# Patient Record
Sex: MALE | Race: WHITE | Employment: FULL TIME | URBAN - METROPOLITAN AREA
[De-identification: names, ages, dates, MRNs, and addresses within clinical notes are randomized per-mention and may not be internally consistent; named-entity substitution may affect disease eponyms.]

---

## 2018-12-28 ENCOUNTER — HOSPITAL ENCOUNTER (EMERGENCY)
Age: 58
Discharge: HOME | End: 2018-12-28
Attending: EMERGENCY MEDICINE
Payer: SELF-PAY

## 2018-12-28 ENCOUNTER — APPOINTMENT (OUTPATIENT)
Dept: CT IMAGING | Age: 58
End: 2018-12-28
Attending: PHYSICIAN ASSISTANT
Payer: SELF-PAY

## 2018-12-28 VITALS
BODY MASS INDEX: 30.61 KG/M2 | RESPIRATION RATE: 18 BRPM | WEIGHT: 195 LBS | HEIGHT: 67 IN | DIASTOLIC BLOOD PRESSURE: 106 MMHG | HEART RATE: 111 BPM | SYSTOLIC BLOOD PRESSURE: 157 MMHG | OXYGEN SATURATION: 97 % | TEMPERATURE: 98.3 F

## 2018-12-28 DIAGNOSIS — R16.0 HEPATOMEGALY: ICD-10-CM

## 2018-12-28 DIAGNOSIS — K42.9 UMBILICAL HERNIA WITHOUT OBSTRUCTION AND WITHOUT GANGRENE: Primary | ICD-10-CM

## 2018-12-28 DIAGNOSIS — R03.0 ELEVATED BLOOD PRESSURE READING: ICD-10-CM

## 2018-12-28 DIAGNOSIS — R10.9 INTERMITTENT ABDOMINAL PAIN: ICD-10-CM

## 2018-12-28 DIAGNOSIS — R74.8 ELEVATED LIVER ENZYMES: ICD-10-CM

## 2018-12-28 LAB
ALBUMIN SERPL-MCNC: 4.1 G/DL (ref 3.4–5)
ALBUMIN/GLOB SERPL: 0.9 {RATIO} (ref 0.8–1.7)
ALP SERPL-CCNC: 136 U/L (ref 45–117)
ALT SERPL-CCNC: 72 U/L (ref 16–61)
ANION GAP SERPL CALC-SCNC: 11 MMOL/L (ref 3–18)
APPEARANCE UR: CLEAR
AST SERPL-CCNC: 67 U/L (ref 15–37)
BASOPHILS # BLD: 0 K/UL (ref 0–0.1)
BASOPHILS NFR BLD: 0 % (ref 0–2)
BILIRUB SERPL-MCNC: 0.3 MG/DL (ref 0.2–1)
BILIRUB UR QL: NEGATIVE
BUN SERPL-MCNC: 12 MG/DL (ref 7–18)
BUN/CREAT SERPL: 10 (ref 12–20)
CALCIUM SERPL-MCNC: 9.9 MG/DL (ref 8.5–10.1)
CHLORIDE SERPL-SCNC: 101 MMOL/L (ref 100–108)
CO2 SERPL-SCNC: 28 MMOL/L (ref 21–32)
COLOR UR: YELLOW
CREAT SERPL-MCNC: 1.16 MG/DL (ref 0.6–1.3)
DIFFERENTIAL METHOD BLD: ABNORMAL
EOSINOPHIL # BLD: 0.5 K/UL (ref 0–0.4)
EOSINOPHIL NFR BLD: 4 % (ref 0–5)
ERYTHROCYTE [DISTWIDTH] IN BLOOD BY AUTOMATED COUNT: 13.4 % (ref 11.6–14.5)
GLOBULIN SER CALC-MCNC: 4.8 G/DL (ref 2–4)
GLUCOSE SERPL-MCNC: 120 MG/DL (ref 74–99)
GLUCOSE UR STRIP.AUTO-MCNC: NEGATIVE MG/DL
HCT VFR BLD AUTO: 49.8 % (ref 36–48)
HGB BLD-MCNC: 16.6 G/DL (ref 13–16)
HGB UR QL STRIP: NEGATIVE
KETONES UR QL STRIP.AUTO: NEGATIVE MG/DL
LEUKOCYTE ESTERASE UR QL STRIP.AUTO: NEGATIVE
LIPASE SERPL-CCNC: 219 U/L (ref 73–393)
LYMPHOCYTES # BLD: 3.1 K/UL (ref 0.9–3.6)
LYMPHOCYTES NFR BLD: 24 % (ref 21–52)
MCH RBC QN AUTO: 30.2 PG (ref 24–34)
MCHC RBC AUTO-ENTMCNC: 33.3 G/DL (ref 31–37)
MCV RBC AUTO: 90.5 FL (ref 74–97)
MONOCYTES # BLD: 1.3 K/UL (ref 0.05–1.2)
MONOCYTES NFR BLD: 10 % (ref 3–10)
NEUTS SEG # BLD: 7.8 K/UL (ref 1.8–8)
NEUTS SEG NFR BLD: 62 % (ref 40–73)
NITRITE UR QL STRIP.AUTO: NEGATIVE
PH UR STRIP: 5 [PH] (ref 5–8)
PLATELET # BLD AUTO: 341 K/UL (ref 135–420)
PMV BLD AUTO: 9.8 FL (ref 9.2–11.8)
POTASSIUM SERPL-SCNC: 4.2 MMOL/L (ref 3.5–5.5)
PROT SERPL-MCNC: 8.9 G/DL (ref 6.4–8.2)
PROT UR STRIP-MCNC: NEGATIVE MG/DL
RBC # BLD AUTO: 5.5 M/UL (ref 4.7–5.5)
SODIUM SERPL-SCNC: 140 MMOL/L (ref 136–145)
SP GR UR REFRACTOMETRY: >1.03 (ref 1–1.03)
UROBILINOGEN UR QL STRIP.AUTO: 0.2 EU/DL (ref 0.2–1)
WBC # BLD AUTO: 12.7 K/UL (ref 4.6–13.2)

## 2018-12-28 PROCEDURE — 80053 COMPREHEN METABOLIC PANEL: CPT

## 2018-12-28 PROCEDURE — 74011636320 HC RX REV CODE- 636/320: Performed by: EMERGENCY MEDICINE

## 2018-12-28 PROCEDURE — 81003 URINALYSIS AUTO W/O SCOPE: CPT

## 2018-12-28 PROCEDURE — 99283 EMERGENCY DEPT VISIT LOW MDM: CPT

## 2018-12-28 PROCEDURE — 74177 CT ABD & PELVIS W/CONTRAST: CPT

## 2018-12-28 PROCEDURE — 85025 COMPLETE CBC W/AUTO DIFF WBC: CPT

## 2018-12-28 PROCEDURE — 83690 ASSAY OF LIPASE: CPT

## 2018-12-28 RX ADMIN — IOPAMIDOL 100 ML: 612 INJECTION, SOLUTION INTRAVENOUS at 17:44

## 2018-12-28 NOTE — ED NOTES
I performed a brief history of the patient here in triage and I have determined that pt will need further treatment and evaluation from the main side ER physician. I have placed initial orders based on the history to help in expediting patients care.     
 
LUCAS Villasenor 3:54 PM

## 2018-12-28 NOTE — ED TRIAGE NOTES
Chronic hx of abd hernia periumbical for many years, recently starting to \"cramp up\" when twisting etc, no recent injury

## 2018-12-29 NOTE — DISCHARGE INSTRUCTIONS
Hernia: Care Instructions  Your Care Instructions    A hernia develops when tissue bulges through a weak spot in the wall of your belly. The groin area and the navel are common areas for a hernia. A hernia can also develop near the area of a surgery you had before. Pressure from lifting, straining, or coughing can tear the weak area, causing the hernia to bulge and be painful. If you cannot push a hernia back into place, the tissue may become trapped outside the belly wall. If the hernia gets twisted and loses its blood supply, it will swell and die. This is called a strangulated hernia. It usually causes a lot of pain. It needs treatment right away. Some hernias need to be repaired to prevent a strangulated hernia. If your hernia causes symptoms or is large, you may need surgery. Follow-up care is a key part of your treatment and safety. Be sure to make and go to all appointments, and call your doctor if you are having problems. It's also a good idea to know your test results and keep a list of the medicines you take. How can you care for yourself at home? · Take care when lifting heavy objects. · Stay at a healthy weight. · Do not smoke. Smoking can cause coughing, which can cause your hernia to bulge. If you need help quitting, talk to your doctor about stop-smoking programs and medicines. These can increase your chances of quitting for good. · Talk with your doctor before wearing a corset or truss for a hernia. These devices are not recommended for treating hernias and sometimes can do more harm than good. There may be certain situations when your doctor thinks a truss would work, but these are rare. When should you call for help?   Call your doctor now or seek immediate medical care if:    · You have new or worse belly pain.     · You are vomiting.     · You cannot pass stools or gas.     · You cannot push the hernia back into place with gentle pressure when you are lying down.     · The area over the hernia turns red or becomes tender.    Watch closely for changes in your health, and be sure to contact your doctor if you have any problems. Where can you learn more? Go to http://rc-johanny.info/. Enter C129 in the search box to learn more about \"Hernia: Care Instructions. \"  Current as of: March 28, 2018  Content Version: 11.8  © 3591-3116 [employer]. Care instructions adapted under license by [employer] (which disclaims liability or warranty for this information). If you have questions about a medical condition or this instruction, always ask your healthcare professional. [place] [address] any warranty or liability for your use of this information. Elevated Blood Pressure: Care Instructions  Your Care Instructions    Blood pressure is a measure of how hard the blood pushes against the walls of your arteries. It's normal for blood pressure to go up and down throughout the day. But if it stays up over time, you have high blood pressure. Two numbers tell you your blood pressure. The first number is the systolic pressure. It shows how hard the blood pushes when your heart is pumping. The second number is the diastolic pressure. It shows how hard the blood pushes between heartbeats, when your heart is relaxed and filling with blood. An ideal blood pressure in adults is less than 120/80 (say \"120 over 80\"). High blood pressure is 140/90 or higher. You have high blood pressure if your top number is 140 or higher or your bottom number is 90 or higher, or both. The main test for high blood pressure is simple, fast, and painless. To diagnose high blood pressure, your doctor will test your blood pressure at different times. After testing your blood pressure, your doctor may ask you to test it again when you are home. If you are diagnosed with high blood pressure, you can work with your doctor to make a long-term plan to manage it.   Follow-up care is a key part of your treatment and safety. Be sure to make and go to all appointments, and call your doctor if you are having problems. It's also a good idea to know your test results and keep a list of the medicines you take. How can you care for yourself at home? · Do not smoke. Smoking increases your risk for heart attack and stroke. If you need help quitting, talk to your doctor about stop-smoking programs and medicines. These can increase your chances of quitting for good. · Stay at a healthy weight. · Try to limit how much sodium you eat to less than 2,300 milligrams (mg) a day. Your doctor may ask you to try to eat less than 1,500 mg a day. · Be physically active. Get at least 30 minutes of exercise on most days of the week. Walking is a good choice. You also may want to do other activities, such as running, swimming, cycling, or playing tennis or team sports. · Avoid or limit alcohol. Talk to your doctor about whether you can drink any alcohol. · Eat plenty of fruits, vegetables, and low-fat dairy products. Eat less saturated and total fats. · Learn how to check your blood pressure at home. When should you call for help? Call your doctor now or seek immediate medical care if:  ? · Your blood pressure is much higher than normal (such as 180/110 or higher). ? · You think high blood pressure is causing symptoms such as:  ¨ Severe headache. ¨ Blurry vision. ? Watch closely for changes in your health, and be sure to contact your doctor if:  ? · You do not get better as expected. Where can you learn more? Go to http://rc-johanny.info/. Enter L530 in the search box to learn more about \"Elevated Blood Pressure: Care Instructions. \"  Current as of: September 21, 2016  Content Version: 11.4  © 1426-2322 [employer]. Care instructions adapted under license by [employer] (which disclaims liability or warranty for this information).  If you have questions about a medical condition or this instruction, always ask your healthcare professional. [place] [address] any warranty or liability for your use of this information.

## 2018-12-29 NOTE — ED PROVIDER NOTES
EMERGENCY DEPARTMENT HISTORY AND PHYSICAL EXAM 
 
Date: 12/28/2018 Patient Name: Charito Artis History of Presenting Illness Chief Complaint Patient presents with  Abdominal Pain History Provided By: Patient Chief Complaint: abd pain Duration: months Timing:  Intermittent Location: bilateral upper abd Additional History (Context): Charito Artis is a 66 y.o. male with obesity and daily ETOH intake of about 6 beers who presents with C/O intermittent bilateral upper abdominal pain that comes and goes. States that he has been feeling this pain for months. States it feels crampy. States he will stretch and it will feel better. Denies associated NVD, fevers, chills, headache, chest pain, SOB. Admits to daily ETOH use, about 6 beers a day. States that he is worried that this belly pain is related to his hernia that he has had for 15 years. States the hernia itself does not hurt. States that he does lift heavy items for his job. Denies any other complaints. PCP: None Past History Past Medical History: 
History reviewed. No pertinent past medical history. Past Surgical History: 
History reviewed. No pertinent surgical history. Family History: 
History reviewed. No pertinent family history. Social History: 
Social History Tobacco Use  Smoking status: Current Every Day Smoker  Smokeless tobacco: Never Used Substance Use Topics  Alcohol use: Yes Alcohol/week: 14.4 oz Types: 24 Cans of beer per week Comment: case a week of beer  Drug use: No  
 
 
Allergies: 
No Known Allergies Review of Systems Review of Systems Constitutional: Negative for chills and fever. Respiratory: Negative for chest tightness, shortness of breath and wheezing. Cardiovascular: Negative for chest pain and palpitations. Gastrointestinal: Positive for abdominal pain. Negative for diarrhea, nausea and vomiting. Bilateral upper side pain hernia Genitourinary: Negative for dysuria, flank pain and frequency. Musculoskeletal: Negative. Skin: Negative. Neurological: Negative for tremors, syncope, speech difficulty and headaches. All other systems reviewed and are negative. Physical Exam  
 
Vitals:  
 12/28/18 1553 12/28/18 1718 12/28/18 1721 12/28/18 1901 BP: (!) 193/114 (!) 157/106 (!) 157/106 Pulse: (!) 126  (!) 113 (!) 111 Resp: 18 18 18 Temp: 98.3 °F (36.8 °C) SpO2: 97%  98% 97% Weight: 88.5 kg (195 lb) Height: 5' 7\" (1.702 m) Physical Exam  
Constitutional: He is oriented to person, place, and time. He appears well-developed and well-nourished. No distress. Appears older than stated age. Has skin changes to suggest chronic drinker with erythema of the cheeks. HENT:  
Head: Normocephalic and atraumatic. Eyes: EOM are normal. Pupils are equal, round, and reactive to light. Neck: Neck supple. Cardiovascular: Normal rate and regular rhythm. Exam reveals no gallop and no friction rub. No murmur heard. Pulmonary/Chest: Effort normal and breath sounds normal. No respiratory distress. He has no wheezes. He has no rales. Abdominal: Soft. Bowel sounds are normal. He exhibits distension. He exhibits no pulsatile liver, no fluid wave, no ascites and no mass. There is hepatomegaly. There is no splenomegaly. There is no tenderness. There is no rebound, no guarding and no CVA tenderness. A hernia is present. There is a umbilical hernia that is easily reducible. There is distension of the abdomen and noted hepatomegaly. No fluid wave or ascites appreciated Non tender to palpation throughout the entire abdomen Musculoskeletal: Normal range of motion. He exhibits no tenderness or deformity. Lymphadenopathy:  
  He has no cervical adenopathy. Neurological: He is alert and oriented to person, place, and time. No tremors Skin: Skin is warm and dry. No rash noted. He is not diaphoretic. Psychiatric: He has a normal mood and affect. His behavior is normal.  
Nursing note and vitals reviewed. Diagnostic Study Results Labs - Recent Results (from the past 12 hour(s)) CBC WITH AUTOMATED DIFF Collection Time: 12/28/18  4:35 PM  
Result Value Ref Range WBC 12.7 4.6 - 13.2 K/uL  
 RBC 5.50 4.70 - 5.50 M/uL  
 HGB 16.6 (H) 13.0 - 16.0 g/dL HCT 49.8 (H) 36.0 - 48.0 % MCV 90.5 74.0 - 97.0 FL  
 MCH 30.2 24.0 - 34.0 PG  
 MCHC 33.3 31.0 - 37.0 g/dL  
 RDW 13.4 11.6 - 14.5 % PLATELET 385 871 - 894 K/uL MPV 9.8 9.2 - 11.8 FL  
 NEUTROPHILS 62 40 - 73 % LYMPHOCYTES 24 21 - 52 % MONOCYTES 10 3 - 10 % EOSINOPHILS 4 0 - 5 % BASOPHILS 0 0 - 2 %  
 ABS. NEUTROPHILS 7.8 1.8 - 8.0 K/UL  
 ABS. LYMPHOCYTES 3.1 0.9 - 3.6 K/UL  
 ABS. MONOCYTES 1.3 (H) 0.05 - 1.2 K/UL  
 ABS. EOSINOPHILS 0.5 (H) 0.0 - 0.4 K/UL  
 ABS. BASOPHILS 0.0 0.0 - 0.1 K/UL  
 DF AUTOMATED METABOLIC PANEL, COMPREHENSIVE Collection Time: 12/28/18  4:35 PM  
Result Value Ref Range Sodium 140 136 - 145 mmol/L Potassium 4.2 3.5 - 5.5 mmol/L Chloride 101 100 - 108 mmol/L  
 CO2 28 21 - 32 mmol/L Anion gap 11 3.0 - 18 mmol/L Glucose 120 (H) 74 - 99 mg/dL BUN 12 7.0 - 18 MG/DL Creatinine 1.16 0.6 - 1.3 MG/DL  
 BUN/Creatinine ratio 10 (L) 12 - 20 GFR est AA >60 >60 ml/min/1.73m2 GFR est non-AA >60 >60 ml/min/1.73m2 Calcium 9.9 8.5 - 10.1 MG/DL Bilirubin, total 0.3 0.2 - 1.0 MG/DL  
 ALT (SGPT) 72 (H) 16 - 61 U/L  
 AST (SGOT) 67 (H) 15 - 37 U/L Alk. phosphatase 136 (H) 45 - 117 U/L Protein, total 8.9 (H) 6.4 - 8.2 g/dL Albumin 4.1 3.4 - 5.0 g/dL Globulin 4.8 (H) 2.0 - 4.0 g/dL A-G Ratio 0.9 0.8 - 1.7 LIPASE Collection Time: 12/28/18  4:35 PM  
Result Value Ref Range Lipase 219 73 - 393 U/L  
URINALYSIS W/ RFLX MICROSCOPIC Collection Time: 12/28/18  5:55 PM  
Result Value Ref Range Color YELLOW  Appearance CLEAR    
 Specific gravity >1.030 (H) 1.005 - 1.030  
 pH (UA) 5.0 5.0 - 8.0 Protein NEGATIVE  NEG mg/dL Glucose NEGATIVE  NEG mg/dL Ketone NEGATIVE  NEG mg/dL Bilirubin NEGATIVE  NEG Blood NEGATIVE  NEG Urobilinogen 0.2 0.2 - 1.0 EU/dL Nitrites NEGATIVE  NEG Leukocyte Esterase NEGATIVE  NEG Radiologic Studies -  
CT ABD PELV W CONT Final Result IMPRESSION:   
  
  
1. Hepatic steatosis and hepatomegaly. 2. Supraumbilical fat-containing ventral hernia with mild stranding. 3. Additional tiny fat-containing umbilical hernia and small to moderate  
fat-containing right inguinal hernia. CT Results  (Last 48 hours) 12/28/18 1742  CT ABD PELV W CONT Final result Impression:  IMPRESSION:   
   
   
1. Hepatic steatosis and hepatomegaly. 2. Supraumbilical fat-containing ventral hernia with mild stranding. 3. Additional tiny fat-containing umbilical hernia and small to moderate  
fat-containing right inguinal hernia. Narrative:  CT ABDOMEN AND PELVIS, WITH CONTRAST INDICATION: Abdominal pain; abdominal distention COMPARISON: None TECHNIQUE: Following intravenous administration of 100 mL Isovue-300, serial  
axial CT images through the abdomen and pelvis were obtained using helical  
technique. Additional coronal and sagittal reformation images were also  
performed. All CT scans at this facility are performed using dose optimization  
technique as appropriate to a performed exam, to include automated exposure  
control, adjustment of the mA and/or kV according to patient's size (including  
appropriate matching for site-specific examinations), or use of iterative  
reconstruction technique. FINDINGS:  
-Lower chest: Visualized portions of lung bases are clear.   
-Liver: Inhomogeneous hepatic steatosis. Liver measures 20.1 cm in craniocaudal  
length. -Biliary: Unremarkable   
-Spleen: Unremarkable -Pancreas: Unremarkable   
-Adrenal glands: Unremarkable   
-Kidneys: Unremarkable -GI tract: The bowel is nonobstructed. The appendix is normal.   
   
-Peritoneum/retroperitoneum: No free intraperitoneal air or fluid.   
-Vasculature: The abdominal aorta is nonaneurysmal. Mild atherosclerosis noted.   
-Lymph nodes:No adenopathy identified.   
   
-Pelvis: The urinary bladder is unremarkable. No pelvic adenopathy or free  
pelvic fluid. Prostate gland measures 3.4 x 3.0 x 3.6 cm. Small to  
moderate-sized fat-containing right inguinal hernia. -Body wall: Supraumbilical fat-containing ventral hernia, with mild stranding. Tiny fat-containing umbilical hernia. -Bones: No acute osseous abnormality. Moderate to severe disc height loss at L5/S1. CXR Results  (Last 48 hours) None Medical Decision Making I am the first provider for this patient. I reviewed the vital signs, available nursing notes, past medical history, past surgical history, family history and social history. Vital Signs-Reviewed the patient's vital signs. Records Reviewed: Nursing Notes and Old Medical Records Procedures: 
Procedures Provider Notes (Medical Decision Making): Impression:   Hepatomegaly, umbilical hernia without strangulation or obstruction. Noted elevated BP as well as elevated LFTs. Will send to GI for Liver, General surgery for hernia, PCP for elevated BP. Encouraged to return if worse at all, particularly if jaundice, fever, worsening abd pain. Patient agrees with the plan. LUCAS Morris 
 
MED RECONCILIATION: 
No current facility-administered medications for this encounter. No current outpatient medications on file. Disposition: 
discharged DISCHARGE NOTE:  
 
Pt has been reexamined. Patient has no new complaints, changes, or physical findings. Care plan outlined and precautions discussed.   Results were reviewed with the patient. All medications were reviewed with the patient; will d/c home. All of pt's questions and concerns were addressed. Patient was instructed and agrees to follow up with PCP, GI, general surgery, as well as to return to the ED upon further deterioration. Patient is ready to go home. Follow-up Information Follow up With Specialties Details Why Contact Info [id number] [facility] EMERGENCY DEPT Emergency Medicine  If symptoms worsen [address] [place] [id number] 
[phone] [facility] [address] [facility] [address] [phone] Hoda Garner [address]  Call to help get your appropriate follow up for primary care, GI, and surgery [facility] [address] [facility] [address] [phone] Lyla Navarro MD Gastroenterology Call in 3 days for liver follow up [address] [place] [address] [address] [facility] 
[phone] Cedrick Vargas MD Surgery Call in 3 days for general surgery [address] [place] [id number] [address] 
[address] [place] [address] 
[phone] There are no discharge medications for this patient. Diagnosis Clinical Impression: 1. Umbilical hernia without obstruction and without gangrene 2. Intermittent abdominal pain 3. Hepatomegaly 4. Elevated liver enzymes 5. Elevated blood pressure reading

## 2018-12-29 NOTE — ED NOTES
I have reviewed discharge instructions with the patient and spouse. The patient and spouse verbalized understanding. Patient armband removed and shredded There are no discharge medications for this patient.

## 2019-02-20 ENCOUNTER — HOSPITAL ENCOUNTER (OUTPATIENT)
Dept: LAB | Age: 59
Discharge: HOME | End: 2019-02-20

## 2019-02-20 ENCOUNTER — OFFICE VISIT (OUTPATIENT)
Dept: FAMILY MEDICINE CLINIC | Age: 59
End: 2019-02-20

## 2019-02-20 VITALS
TEMPERATURE: 98.2 F | HEART RATE: 121 BPM | HEIGHT: 67 IN | DIASTOLIC BLOOD PRESSURE: 108 MMHG | RESPIRATION RATE: 24 BRPM | BODY MASS INDEX: 31.64 KG/M2 | OXYGEN SATURATION: 98 % | SYSTOLIC BLOOD PRESSURE: 161 MMHG | WEIGHT: 201.6 LBS

## 2019-02-20 DIAGNOSIS — R79.89 ELEVATED TSH: ICD-10-CM

## 2019-02-20 DIAGNOSIS — F17.200 CURRENT SMOKER: ICD-10-CM

## 2019-02-20 DIAGNOSIS — K46.9 ABDOMINAL HERNIA WITHOUT OBSTRUCTION AND WITHOUT GANGRENE, RECURRENCE NOT SPECIFIED, UNSPECIFIED HERNIA TYPE: ICD-10-CM

## 2019-02-20 DIAGNOSIS — F10.10 ALCOHOL ABUSE: ICD-10-CM

## 2019-02-20 DIAGNOSIS — E78.5 DYSLIPIDEMIA, GOAL LDL BELOW 100: ICD-10-CM

## 2019-02-20 DIAGNOSIS — Z12.11 COLON CANCER SCREENING: ICD-10-CM

## 2019-02-20 DIAGNOSIS — I10 ESSENTIAL HYPERTENSION: ICD-10-CM

## 2019-02-20 DIAGNOSIS — R74.8 ELEVATED LIVER ENZYMES: ICD-10-CM

## 2019-02-20 DIAGNOSIS — Z79.899 MEDICATION MANAGEMENT: ICD-10-CM

## 2019-02-20 DIAGNOSIS — E78.1 HYPERTRIGLYCERIDEMIA: ICD-10-CM

## 2019-02-20 DIAGNOSIS — R00.2 HEART PALPITATIONS: ICD-10-CM

## 2019-02-20 DIAGNOSIS — Z76.89 ENCOUNTER TO ESTABLISH CARE: ICD-10-CM

## 2019-02-20 DIAGNOSIS — R00.0 TACHYCARDIA: ICD-10-CM

## 2019-02-20 DIAGNOSIS — Z76.89 ENCOUNTER TO ESTABLISH CARE: Primary | ICD-10-CM

## 2019-02-20 LAB
AMPHET UR QL SCN: NEGATIVE
BARBITURATES UR QL SCN: NEGATIVE
BENZODIAZ UR QL: NEGATIVE
CANNABINOIDS UR QL SCN: NEGATIVE
CHOLEST SERPL-MCNC: 219 MG/DL
COCAINE UR QL SCN: NEGATIVE
EST. AVERAGE GLUCOSE BLD GHB EST-MCNC: 123 MG/DL
HBA1C MFR BLD: 5.9 % (ref 4.2–5.6)
HDLC SERPL-MCNC: 32 MG/DL (ref 40–60)
HDLC SERPL: 6.8 {RATIO} (ref 0–5)
HDSCOM,HDSCOM: NORMAL
LDLC SERPL CALC-MCNC: 149.8 MG/DL (ref 0–100)
LIPID PROFILE,FLP: ABNORMAL
MAGNESIUM SERPL-MCNC: 2.2 MG/DL (ref 1.6–2.6)
METHADONE UR QL: NEGATIVE
OPIATES UR QL: NEGATIVE
PCP UR QL: NEGATIVE
PSA SERPL-MCNC: 0.4 NG/ML (ref 0–4)
T3FREE SERPL-MCNC: 2.4 PG/ML (ref 2.18–3.98)
T4 FREE SERPL-MCNC: 0.8 NG/DL (ref 0.7–1.5)
TRIGL SERPL-MCNC: 186 MG/DL (ref ?–150)
TSH SERPL DL<=0.05 MIU/L-ACNC: 10.6 UIU/ML (ref 0.36–3.74)
VLDLC SERPL CALC-MCNC: 37.2 MG/DL

## 2019-02-20 PROCEDURE — 84439 ASSAY OF FREE THYROXINE: CPT

## 2019-02-20 PROCEDURE — 83036 HEMOGLOBIN GLYCOSYLATED A1C: CPT

## 2019-02-20 PROCEDURE — 83735 ASSAY OF MAGNESIUM: CPT

## 2019-02-20 PROCEDURE — 87389 HIV-1 AG W/HIV-1&-2 AB AG IA: CPT

## 2019-02-20 PROCEDURE — 84481 FREE ASSAY (FT-3): CPT

## 2019-02-20 PROCEDURE — 84153 ASSAY OF PSA TOTAL: CPT

## 2019-02-20 PROCEDURE — 84443 ASSAY THYROID STIM HORMONE: CPT

## 2019-02-20 PROCEDURE — 80061 LIPID PANEL: CPT

## 2019-02-20 PROCEDURE — 80307 DRUG TEST PRSMV CHEM ANLYZR: CPT

## 2019-02-20 RX ORDER — LISINOPRIL 10 MG/1
10 TABLET ORAL DAILY
Qty: 30 TAB | Refills: 3 | Status: SHIPPED | OUTPATIENT
Start: 2019-02-20 | End: 2019-05-22 | Stop reason: SDUPTHER

## 2019-02-20 RX ORDER — AMLODIPINE BESYLATE 10 MG/1
TABLET ORAL DAILY
COMMUNITY
End: 2019-05-22 | Stop reason: SDUPTHER

## 2019-02-20 RX ORDER — AMLODIPINE BESYLATE 5 MG/1
5 TABLET ORAL DAILY
COMMUNITY
End: 2019-02-20

## 2019-02-20 RX ORDER — METOPROLOL TARTRATE 25 MG/1
25 TABLET, FILM COATED ORAL 2 TIMES DAILY
Qty: 60 TAB | Refills: 3 | Status: SHIPPED | OUTPATIENT
Start: 2019-02-20 | End: 2019-05-22 | Stop reason: SDUPTHER

## 2019-02-20 NOTE — PATIENT INSTRUCTIONS
[facility] reminders! Foundation Operating Hours: These may change without notice. Mon- Wed 7am to 5pm. Closed for lunch 12-1pm  Thurs 7am to 12pm  Fridays closed     Office number:     **In case of inclement weather (snow and/or ice) please do not try to come into the office for your appointment. Please call in and you will not be counted as a \"No Show. \" SAFETY FIRST!!**    NO SHOW POLICY ~ If a patient has 3 no shows for an appointment with their Provider, Mental Health Provider, or the Nurse Navigator, they will be discharged from the practice for 6 months. Medication ordering will also be suspended. If the patient is discharged from the [facility], they can go to the [place] [address] or [address] [place] [address] where they can be seen for their primary care needs plus obtain the same type of medications as they received at the [facility]. To avoid being discharged the patient must call the office at [phone] 24 hours prior to their appointment if they need to cancel or reschedule, arrive to their appointments on time (preferrably 15 minutes early) (If you are late you will not be seen) and come to all scheduled appointments with the provider, mental health, and/or nurse navigator. If the patient is discharged from the practice, they can apply to be re-established after 6 months. Lab work:    Unless you are instructed differently, please return to the office between the hours of 7 am and 11:00 am Monday through Thursday to have your labs drawn one to two weeks before your next scheduled PROVIDER appointment. If you do not have an appointment to follow up on these results, please make one or plan to call the office if you do not hear from us to get the results. No news does not mean good news. Medications:   Medication  times are firm:  Mondays and Tuesdays from 1pm-5pm  Wednesdays and Thursdays from 8am-11:30am  These hours are subject to change without notice.     The [facility] or [facility] will assist you with your medications when available as not all medications can be obtained through this program. Medications are added and deleted from the [address] as deemed necessary by the pharmaceutical companies. There is a chance that a medication you currently receive from [facility] may be discontinued. If this occurs an alternative medication will be sent to a local pharmacy for you to obtain and pay for. If your medications are new or have changed, and you get your medications from the [facility]. [place] [address] [place] [address]), you MUST talk to the pharmacy staff to sign the new prescription applications. If you don't sign the applications we cannot get the medications for you. It usually takes 6-8 weeks for your medications to arrive. The Pharmacy staff will call you when your medications are available. If you don't hear from them you call [phone] and inquire about your medicines. You are responsible for obtaining your medications. You will have 30 days to come in and  your medications. If you don't  your medicines within those 30 days, those medicines may be placed on the self as samples and you will have to start all over again by completing the applications and waiting the 6-8 weeks for your medicines to arrive. Foot Care: Local ministry through [facility] ([address] [facility])  Every second Tuesday of the month (except for holidays and election days) from 9am to 1 pm. The services provided by these ministry volunteers are free of charge with the option to donate. They will inspect your feet thoroughly, soak them for 10 minutes, cut and file your nails. They care for diabetics as well. Keep in mind this service is free and will be on a first come first serve basis. Bad teeth? Ask about the Dental Clinic to get you in front of a local dentist when a dentist is available. They only extract teeth. No fillings, root canals, or dentures.  The bus leaves the second Thursday of the month for those on the list. (Ask about availability as these appointments are limited). If you are scheduled for the dentist and you fail to come into the Foundation to meet the bus, you WILL NOT be placed on the dental list again. IMPORTANT: if you have high blood pressure please take your blood pressure  medications before arriving to the Foundation. If your blood pressure is elevated  the dental clinic WILL NOT extract any teeth and you will not have another  opportunity to go to the clinic. DIABETES:   Do you have uncontrolled diabetes or you just want to learn more about your diabetes? Schedule with the Nurse navigator for our new 5-week Diabetes program. You will learn how to properly manage your diabetes: nutrition, exercise, medication therapy. Eye exams for Diabetics. Please let us know so we can add you to the list to see an eye doctor. These  appointments are limited. You will receive a free eye exam and free glasses if  needed. Unfortunately, if you are not a diabetic, we do not have a free service  for eye exams for you (yet!). We do have information on where to  go to get a  huge discount on eye exams and glasses. Sick visits: If you are sick and it is not an emergency call the office to schedule an appointment to see the provider. Care in the office is FREE but charges will be applied if you go to the Emergency room. If you feel better and do not wish to attend your sick appointment please call  the office and cancel to avoid a no-show. Charges and cost items from the Foundation:    Most of our orders are covered by [address] [place] [address] but there ARE SOME CHARGES for items such as radiology interpretations and anesthesiology during procedures and surgeries that are not covered under [facility].       [employer]   Please make sure you have contacted [employer] to check on your payment options:   1 [phone] Mon - Fri 8-4pm. It is important that you are screened in order to qualify for assistance and to avoid huge medical charges. This service is to benefit you. The [facility] is not responsible for ANY charges you may accrue regardless of who ordered the medication, procedure, treatment or test. If you go to the Emergency Room, you WILL be charged! Behavior and emotional issues! It is stressful to be sick, have an illness, take medications, not have a job, not have medical insurance, have family issues or just getting older! Schedule an appointment with our mental health provider. She is in the office Mondays and Wednesdays from 8am to [address] [facility] can also contact the following: The national suicide hotline (7-402-816-RVNM or 8-967.452.2610)    [address], [address] [place] [address] [address] [place] [address], [address] [place] [address]  [phone]    Drug and Alcohol Addiction Issues! It is hard to stop a poor habit but there is help out there. Please feel free to attend any of the following support groups to help you kick the habit or go to [facility] to be evaluated by the psychiatric team. Never give up!! AlAnon meetings: [facility] MONDAY 10:30 AM [facility] [address] SATURDAY 8:00 PM [facility] SATURDAY NIGHT [facility] [address]         TIJODI BITS:  Disposing medicines in household trash: Almost all medicines can be thrown into your household trash. These include prescription and over-the-counter (OTC) drugs in pills, liquids, drops, patches, creams, and inhalers. Follow these steps:  1) Remove the drugs from their original containers and mix them with something undesirable, such as used coffee grounds, dirt, or cat litter.  This makes the medicine less appealing to children and pets and unrecognizable to someone who might intentionally go through the trash looking for drugs. 2) Put the mixture in something you can close (a re-sealable zipper storage bag, empty can, or other container) to prevent the drug from leaking or spilling out. 3) Throw the container in the garbage. 4) Scratch out all your personal information on the empty medicine packaging to protect your identity and privacy. Throw the packaging away.     If you have a question about your medicine, ask your health care provider or pharmacist.

## 2019-02-20 NOTE — PROGRESS NOTES
FRANCESCA IRBY [facility]  [facility], [address] [facility]  [phone] office/[phone] fax      2/20/2019    Reason for visit:   Chief Complaint   Patient presents with    Establish Care    Hypertension    Hernia (Non Specific)     Umbilical    Elevated Liver Enzymes     noted at [facility]       Patient: Fernando Khan, 1960, [id number]       Primary MD: Sabrina Cooper NP    Subjective:   Fernando Khan, a 66 y.o. male, who presents for Establish Care; Hypertension; Hernia (Non Specific) (Umbilical); and Elevated Liver Enzymes (noted at [facility])         Pt is here to establish care. Pt is accompanied by sister Chelly Last. Pt is an unemployed upholstery worker. Pt lives with mom and does not drive. Pt has \"never seen a doctor until now\" Pt denies alcoholism but confessed to 44 yrs of drinking a 6 pack of beer per day. Pt states he has limited himself to 2 beers per week now because of \"my liver\" Pts [facility] disag[facility] with patient and states her brother is in fact an alcoholic. Pt admits to smoking 1.5 packs of cigs per day until recently he has reduced his cigarettes to 10 per day. [facility] did most of the talking and patient would answer questions as well. Pt sister states she took patient to [facility] ED where he was dx with a hernia, enlarged liver and elevated  liver enzymes. [facility] states pt has already seen the GI MD about pts enlarged liver. [facility] states she took pt to [facility] for HTN (dx at [facility] ED) and pt was started on amlodipine 5mg. 2 weeks later [facility] increased amlodipine to 10mg-pt has been taking 10mg x1 week. Pt states he has an umbilical hernia that he has had for many years. Pt denies any pain to the umbilical area but does reveal the hernia when he lays back or sits up. Past Medical History:   Diagnosis Date    Abnormal liver enzymes 2019    Enlarged liver 2019    Hypertension        History reviewed.  No pertinent surgical history. Social History     Socioeconomic History    Marital status:      Spouse name: Not on file    Number of children: 0    Years of education: 8    Highest education level: 8th grade   Social Needs    Financial resource strain: Not on file    Food insecurity - worry: Not on file    Food insecurity - inability: Not on file   [employer] needs - medical: Not on file   [employer] needs - non-medical: Not on file   Occupational History    Occupation: Unemployed   Tobacco Use    Smoking status: Current Every Day Smoker     Packs/day: 0.50     Years: 39.00     Pack years: 19.50    Smokeless tobacco: Never Used   Substance and Sexual Activity    Alcohol use: Yes     Alcohol/week: 1.2 oz     Types: 2 Cans of beer per week     Frequency: 2-3 times a week     Drinks per session: 1 or 2     Comment: has cut down x 2 wks to 2 beers a week    Drug use: No    Sexual activity: Not Currently   Other Topics Concern     Service No    Blood Transfusions No    Caffeine Concern Yes    Occupational Exposure No    Hobby Hazards No    Sleep Concern Yes    Stress Concern Yes    Weight Concern No    Special Diet No    Back Care No    Exercise Yes    Bike Helmet No    Seat Belt Yes    Self-Exams Yes   Social History Narrative    ** Merged History Encounter **            No Known Allergies    Current Outpatient Medications on File Prior to Visit   Medication Sig Dispense Refill    amLODIPine (NORVASC) 10 mg tablet Take  by mouth daily. No current facility-administered medications on file prior to visit. Review of Systems   Constitutional: Negative. HENT: Negative. Eyes: Negative. Respiratory: Negative. Negative for cough, shortness of breath and wheezing. Cardiovascular: Positive for palpitations. Sometimes I can feel my heart beat fast for a second but it goes away   Gastrointestinal: Negative.          I have this hernia but it doesn't really bother me, I just know it is there   Endocrine: Negative. Genitourinary: Negative. Musculoskeletal: Negative. Skin: Negative. Allergic/Immunologic: Negative. Neurological: Negative. Hematological: Negative. Psychiatric/Behavioral:        I have been cutting back on my beer and my cigarettes, especially since they told me that I had liver issues. Objective:   Visit Vitals  BP (!) 161/108   Pulse (!) 121   Temp 98.2 °F (36.8 °C)   Resp 24   Ht 5' 7\" (1.702 m)   Wt 201 lb 9.6 oz (91.4 kg)   SpO2 98%   BMI 31.58 kg/m²      Wt Readings from Last 3 Encounters:   02/20/19 201 lb 9.6 oz (91.4 kg)   12/28/18 195 lb (88.5 kg)     Lab Results   Component Value Date/Time    Glucose 120 (H) 12/28/2018 04:35 PM         Physical Exam   Constitutional: He is oriented to person, place, and time. He appears well-nourished. HENT:   Head: Atraumatic. Neck: Neck supple. Cardiovascular: Regular rhythm, S1 normal and S2 normal. Tachycardia present. Pulmonary/Chest: Effort normal and breath sounds normal. No respiratory distress. He has no wheezes. Abdominal: Bowel sounds are normal.   Musculoskeletal: Normal range of motion. Neurological: He is alert and oriented to person, place, and time. Skin: Skin is warm and dry. Psychiatric: He has a normal mood and affect. His behavior is normal. Judgment and thought content normal.   Vitals reviewed. Assessment:    Devang Knapp who has risk factors including (see above previous medical hx) and:       1. Encounter to establish care    - HEMOGLOBIN A1C WITH EAG; Future  - HIV 1/2 AG/AB, 4TH GENERATION,W RFLX CONFIRM; Future  - LIPID PANEL; Future  - MAGNESIUM; Future  - PSA, DIAGNOSTIC (PROSTATE SPECIFIC AG); Future  - TSH 3RD GENERATION; Future  - T4, FREE; Future  - T3, FREE; Future  - DRUG SCREEN, URINE; Future    2.  Essential hypertension  Reviewed current value and goal, discussed dietary and stress changes that would help, discussed medications with correct way to take them and side effects that are typical and those that should be reported such as near syncope, ankle edema, rash or shortness of breath. Discussed consequences of poor management with vascular stress, increased occurrence of CVA and MI leading to death. Pt is currently taking amlodipine 10mg every day. Will add an ACE at this time. BP recheck with NN in 1 week. - lisinopril (PRINIVIL, ZESTRIL) 10 mg tablet; Take 1 Tab by mouth daily. For blood pressure  Dispense: 30 Tab; Refill: 3  - aspirin 81 mg chewable tablet; Take 1 Tab by mouth daily. For heart health  Dispense: 30 Tab; Refill: 11    3. Tachycardia  Initiated BB due to tachy and pt being symptomatic. This will also help with reducing BP.    - metoprolol tartrate (LOPRESSOR) 25 mg tablet; Take 1 Tab by mouth two (2) times a day. For heart rate  Dispense: 60 Tab; Refill: 3    4. Heart palpitations      5. Hypertriglyceridemia  Pts ASCVD score is 28.6. Will initiate low dose of statin at this time. Will monitor LFTs while on treatment. - atorvastatin (LIPITOR) 10 mg tablet; Take 1 Tab by mouth daily. For cholesterol  Dispense: 30 Tab; Refill: 3    6. Dyslipidemia, goal LDL below 100    - atorvastatin (LIPITOR) 10 mg tablet; Take 1 Tab by mouth daily. For cholesterol  Dispense: 30 Tab; Refill: 3    7. Elevated liver enzymes  Pt to cont to follow up with [facility].    - REFERRAL TO GASTROENTEROLOGY    8. Alcohol abuse  Alcohol reduction/cessation: Discussed the risks of continued alcohol use such as erosion of the oral/esophageal/stomach/intestine mucosa and causing nausea/vomiting/pain/ or ulceration leading to cancer, liver damage, brain atrophy resulting in early dementia, nerve damage leading to peripheral neuropathy, and cardiac changes leading to cardiomegaly that can lead to cardiac dysrhythmias and death.  Information provided about local resources including Kelley Anderson and the Behavioral Health Department at [facility] which can be accessed 24/7. [facility] has weekly AA meeting and [place] has all [address] [facility] AA meetings nightly. Please contact the local AA for more information on dates and times as they update monthly. 9. Current smoker  Counseled patient on the dangers of tobacco use, and was advised to quit. Reviewed strategies to maximize success, including the use of Chantix. Discussed the risks of continued tobacco use such as elevated blood pressure, vascular irritation with increased incidence of CVD with stroke or MI and PVD causing claudication, lung damage that could lead to COPD, cancer and death. Encouraged an approach to find a few healthy habits, write them down then plan to decrease their cigarette use by one each week till they are gone all together and to plan for stress that may cause them to want to restart and how to prevent it by having new coping mechanisms in place. 1-800-QUIT-NOW provided for counseling       10. Colon cancer screening  Pt received FIT kit and educated on how to obtain sample and how to return sample. Pt verb understanding.    - OCCULT BLOOD IMMUNOASSAY,DIAGNOSTIC; Future    11. Abdominal hernia without obstruction and without gangrene, recurrence not specified, unspecified hernia type  No treatment needed at this time    12. Medication management      See additional info under plan      Written instructions followed our verbal discussion of all information discussed above, pending tests ordered and future goals/plans. Patient expressed understanding of current diagnosis, planned testing, follow up and if needed to contact the office for any questions or concerns prior to the next visit. Plan:       Reviewed medication and completed the medication reconciliation with the patient. Reviewed side effects of medications with the patient. Questions were answered and patient verb understanding.       Labs obtained to establish baseline, evaluate metabolic health, nutritional status, vitamin deficiencies and screening for at risk items based on the demographics of the patient, previous medical history and current social practices.  Will contact the patient in when all labs are resulted by phone to review and make lifestyle and medication recommendations. Follow up labs will be completed to monitor improvement prior to their next visit. NN visit:    June Andrews, new patient appt 2/27/19. 1) Please check BP and call me with reading before patient leaves the building. Lisinopril and metoprolol was added to BP med regimen to include amlodipine. 2) A1c 5.9. work on diet. 3)  goal ,150; .8 goal < 100. Will initiate treatment. ASCVD score 28.6. Atorvastatin 10mg every day   4) TSH 10.6. Pt is asymptomatic. No previous labs to compare to. Will recheck in 3 months. If level remains elevated, will initiate hypothyroid treatment. 5) See if pt wants flu vaccine  6) Pt needs to take ASA 81mg daily for heart health. Pt will need labs 1-2 weeks prior to follow up appt with me 5/22/19.  (lipid, CMP, Thy levels)  Orders Placed This Encounter    HEMOGLOBIN A1C WITH EAG     Standing Status:   Future     Number of Occurrences:   1     Standing Expiration Date:   2/27/2019    HIV 1/2 AG/AB, 4TH GENERATION,W RFLX CONFIRM     Standing Status:   Future     Number of Occurrences:   1     Standing Expiration Date:   2/27/2019    LIPID PANEL     Standing Status:   Future     Number of Occurrences:   1     Standing Expiration Date:   2/27/2019    MAGNESIUM     Standing Status:   Future     Number of Occurrences:   1     Standing Expiration Date:   2/27/2019    PROSTATE SPECIFIC AG     Standing Status:   Future     Number of Occurrences:   1     Standing Expiration Date:   2/27/2019    TSH 3RD GENERATION     Standing Status:   Future     Number of Occurrences:   1     Standing Expiration Date:   2/27/2019    T4, FREE     Standing Status:   Future     Number of Occurrences:   1     Standing Expiration Date:   2/27/2019    T3, FREE     Standing Status:   Future     Number of Occurrences:   1     Standing Expiration Date:   2/27/2019    OCCULT BLOOD IMMUNOASSAY,DIAGNOSTIC     Standing Status:   Future     Standing Expiration Date:   2/21/2020    DRUG SCREEN, URINE     Standing Status:   Future     Number of Occurrences:   1     Standing Expiration Date:   2/27/2019    LIPID PANEL     Standing Status:   Future     Standing Expiration Date:   5/02/4818    METABOLIC PANEL, COMPREHENSIVE     Standing Status:   Future     Standing Expiration Date:   6/30/2019    TSH 3RD GENERATION     Standing Status:   Future     Standing Expiration Date:   6/30/2019    T4, FREE     Standing Status:   Future     Standing Expiration Date:   6/30/2019    T3, FREE     Standing Status:   Future     Standing Expiration Date:   6/30/2019    REFERRAL TO GASTROENTEROLOGY     Referral Priority:   Routine     Referral Type:   Consultation     Referral Reason:   Specialty Services Required     Referred to Provider:   Zeke Degroot MD     Requested Specialty:   Gastroenterology     Number of Visits Requested:   1    DISCONTD: amLODIPine (NORVASC) 5 mg tablet     Sig: Take 5 mg by mouth daily.  amLODIPine (NORVASC) 10 mg tablet     Sig: Take  by mouth daily.  metoprolol tartrate (LOPRESSOR) 25 mg tablet     Sig: Take 1 Tab by mouth two (2) times a day. For heart rate     Dispense:  60 Tab     Refill:  3    lisinopril (PRINIVIL, ZESTRIL) 10 mg tablet     Sig: Take 1 Tab by mouth daily. For blood pressure     Dispense:  30 Tab     Refill:  3    atorvastatin (LIPITOR) 10 mg tablet     Sig: Take 1 Tab by mouth daily. For cholesterol     Dispense:  30 Tab     Refill:  3    aspirin 81 mg chewable tablet     Sig: Take 1 Tab by mouth daily.  For heart health     Dispense:  30 Tab     Refill:  11     Current Outpatient Medications   Medication Sig Dispense Refill    atorvastatin (LIPITOR) 10 mg tablet Take 1 Tab by mouth daily. For cholesterol 30 Tab 3    aspirin 81 mg chewable tablet Take 1 Tab by mouth daily. For heart health 30 Tab 11    amLODIPine (NORVASC) 10 mg tablet Take  by mouth daily.  metoprolol tartrate (LOPRESSOR) 25 mg tablet Take 1 Tab by mouth two (2) times a day. For heart rate 60 Tab 3    lisinopril (PRINIVIL, ZESTRIL) 10 mg tablet Take 1 Tab by mouth daily. For blood pressure 30 Tab 3       Follow-up Disposition:  Return in about 3 months (around 5/20/2019) for Hypertension. Edel Ramos. Uli RN, MSN, [employer]   [address] [place] [address]      I spent 60 minutes with the patient in face-to-face consultation, of which greater than 50% was spent in counseling and coordination of care as described above.

## 2019-02-21 PROBLEM — E78.1 HYPERTRIGLYCERIDEMIA: Status: ACTIVE | Noted: 2019-02-21

## 2019-02-21 PROBLEM — E78.5 DYSLIPIDEMIA, GOAL LDL BELOW 100: Status: ACTIVE | Noted: 2019-02-21

## 2019-02-21 LAB
HIV 1+2 AB+HIV1 P24 AG SERPL QL IA: NONREACTIVE
HIV12 RESULT COMMENT, HHIVC: NORMAL

## 2019-02-21 RX ORDER — ATORVASTATIN CALCIUM 10 MG/1
10 TABLET, FILM COATED ORAL DAILY
Qty: 30 TAB | Refills: 3 | Status: SHIPPED | OUTPATIENT
Start: 2019-02-21 | End: 2019-06-17 | Stop reason: SDUPTHER

## 2019-02-21 RX ORDER — GUAIFENESIN 100 MG/5ML
81 LIQUID (ML) ORAL DAILY
Qty: 30 TAB | Refills: 11
Start: 2019-02-21

## 2019-02-21 NOTE — PROGRESS NOTES
Barby Morillo is a 66 y.o. male is here for  initial visit with the Nurse Navigator for orientation appointment to learn on how the [facility] works and the services we can provide. We have reviewed [facility]:   Hours of operation and number to contact us. Inclement weather policy     No Show Policy     IF YOU ARE TAKING MEDICINE FOR HIGH BLOOD PRESSURE~ PLEASE TAKE YOUR 2 HOURS PRIOR TO ANY OFFICE VISIT TO SEE YOUR PROVIDER OR THE   NURSES. ~~~PLEASE BRING ALL MEDICATIONS YOU ARE TAKING TO YOUR VISIT WITH YOUR PROVIDER OR NURSE NAVIGATOR~~~     Lab work (Hours to have lab work drawn). Aware to RTO one week before May visit. Medication Policies including times to  med's, number to dial to reach your pharmacy technician and the paperwork that must be signed to obtain med's. Please  check in with your technician each time you are in the building. Dental Clinic: Jan Abebe has put patient on dental list     Diabetic eye exams     Sick visits     APA Program including location at [facility] and phone contact number. PLEASE NOTE THE APA CONTACT IS AT THE [facility] DAILY  FROM 930 AM-4PM.YOU MUST SEE APA BEFORE BEING REFERRED TO A SPECIALIST! Mental Health appointments with Ms. Jeramie Ruiz are scheduled on Mondays and Wednesdays between the hours of 8:00-3:00. Please call the main number at [phone] to schedule an appointment. Suicide Hotline Number and how to contact AA/NA for meetings for help with addictions. We have reviewed Mr. Vivian Zuñiga lab work today as requested by Provider. AGAPITO Khan, ABIODUN Gaston, new patient appt 2/27/19. 1) Please check BP and call me with reading before patient leaves the building. Lisinopril and metoprolol was added to BP med regimen to include amlodipine. 2) A1c 5.9. work on diet. 3)  goal ,150; .8 goal < 100. Will initiate treatment. ASCVD score 28.6.  Atorvastatin 10mg every day escribed to [address]    4) TSH 10.6. Pt is asymptomatic. No previous labs to compare to. Will recheck in 3 months. If level remains elevated, will initiate hypothyroid treatment. 5) See if pt wants flu vaccine   6) Pt needs to take ASA 81mg daily for heart health. Pt will need labs 1-2 weeks prior to follow up appt with me 5/22/19. (lipid, CMP, Thy levels)      Pt will  Lipitor and begin taking and continue other meds as ordered. He will also obtain 81 mg baby aspirin and begin taking one daily.   Patient states \" I have cut back to 10 cigarettes a day and have cut back my beer to 6/week\"

## 2019-02-21 NOTE — PROGRESS NOTES
Talha Donohue, new patient appt 2/27/19. 1) Please check BP and call me with reading before patient leaves the building. Lisinopril and metoprolol was added to BP med regimen to include amlodipine. 2) A1c 5.9. work on diet. 3)  goal ,150; .8 goal < 100. Will initiate treatment. ASCVD score 28.6. Atorvastatin 10mg every day escribed to [address]   4) TSH 10.6. Pt is asymptomatic. No previous labs to compare to. Will recheck in 3 months. If level remains elevated, will initiate hypothyroid treatment. 5) See if pt wants flu vaccine  6) Pt needs to take ASA 81mg daily for heart health. Pt will need labs 1-2 weeks prior to follow up appt with me 5/22/19.  (lipid, CMP, Thy levels)

## 2019-02-25 ENCOUNTER — HOSPITAL ENCOUNTER (OUTPATIENT)
Dept: LAB | Age: 59
Discharge: HOME | End: 2019-02-25

## 2019-02-25 DIAGNOSIS — Z12.11 COLON CANCER SCREENING: ICD-10-CM

## 2019-02-25 PROCEDURE — 82274 ASSAY TEST FOR BLOOD FECAL: CPT

## 2019-02-27 ENCOUNTER — OFFICE VISIT (OUTPATIENT)
Dept: FAMILY MEDICINE CLINIC | Age: 59
End: 2019-02-27

## 2019-02-27 VITALS
RESPIRATION RATE: 16 BRPM | DIASTOLIC BLOOD PRESSURE: 84 MMHG | SYSTOLIC BLOOD PRESSURE: 136 MMHG | OXYGEN SATURATION: 96 % | HEART RATE: 84 BPM

## 2019-02-27 DIAGNOSIS — Z55.9 EDUCATIONAL CIRCUMSTANCE: ICD-10-CM

## 2019-02-27 DIAGNOSIS — Z01.30 BLOOD PRESSURE CHECK: Primary | ICD-10-CM

## 2019-02-27 DIAGNOSIS — R03.1 BLOOD PRESSURE LOWER THAN PRIOR MEASUREMENT: ICD-10-CM

## 2019-02-27 SDOH — EDUCATIONAL SECURITY - EDUCATION ATTAINMENT: PROBLEMS RELATED TO EDUCATION AND LITERACY, UNSPECIFIED: Z55.9

## 2019-02-28 LAB — HEMOCCULT STL QL IA: POSITIVE

## 2019-03-04 DIAGNOSIS — R19.5 POSITIVE FIT (FECAL IMMUNOCHEMICAL TEST): Primary | ICD-10-CM

## 2019-03-04 NOTE — PROGRESS NOTES
Renée [place], Please notify pt their FIT was positive. I have placed a referral to colo-rectal for consult.  Please notify pt of result and appt date and time with colo-rectal. Thank you

## 2019-03-05 NOTE — PROGRESS NOTES
He is going on 6/5/19 to [place] [id number] for elevated liver enzymes. Colon and rectal will schedule for +FIT.

## 2019-03-05 NOTE — PROGRESS NOTES
Called patient and informed him of positive FIT for colon ca screening. Informed patient that he has been referred to colon & rectal and will be called for an initial nurse visit to explain next step which reviews the prep and colonoscopy. The patient states that [facility] just called him with an appointment 6/5/19 at 56. Informed patient to keep appt and will also forward result to [facility] for review. Patient voiced understanding.

## 2019-03-07 ENCOUNTER — TELEPHONE (OUTPATIENT)
Dept: FAMILY MEDICINE CLINIC | Age: 59
End: 2019-03-07

## 2019-05-22 ENCOUNTER — OFFICE VISIT (OUTPATIENT)
Dept: FAMILY MEDICINE CLINIC | Age: 59
End: 2019-05-22

## 2019-05-22 VITALS
HEIGHT: 67 IN | DIASTOLIC BLOOD PRESSURE: 86 MMHG | BODY MASS INDEX: 29.76 KG/M2 | TEMPERATURE: 98.2 F | RESPIRATION RATE: 20 BRPM | OXYGEN SATURATION: 98 % | SYSTOLIC BLOOD PRESSURE: 143 MMHG | HEART RATE: 96 BPM | WEIGHT: 189.6 LBS

## 2019-05-22 DIAGNOSIS — J30.89 ENVIRONMENTAL AND SEASONAL ALLERGIES: ICD-10-CM

## 2019-05-22 DIAGNOSIS — I10 ESSENTIAL HYPERTENSION: Primary | ICD-10-CM

## 2019-05-22 DIAGNOSIS — R00.0 TACHYCARDIA: ICD-10-CM

## 2019-05-22 DIAGNOSIS — E03.9 HYPOTHYROIDISM, UNSPECIFIED TYPE: ICD-10-CM

## 2019-05-22 RX ORDER — METOPROLOL TARTRATE 25 MG/1
25 TABLET, FILM COATED ORAL 2 TIMES DAILY
Qty: 60 TAB | Refills: 3 | Status: SHIPPED | OUTPATIENT
Start: 2019-05-22 | End: 2019-08-15 | Stop reason: SDUPTHER

## 2019-05-22 RX ORDER — FLUTICASONE PROPIONATE 50 MCG
2 SPRAY, SUSPENSION (ML) NASAL DAILY
Qty: 1 BOTTLE | Refills: 1 | Status: SHIPPED | OUTPATIENT
Start: 2019-05-22 | End: 2019-08-14 | Stop reason: SDUPTHER

## 2019-05-22 RX ORDER — AMLODIPINE BESYLATE 10 MG/1
10 TABLET ORAL DAILY
Qty: 30 TAB | Refills: 3 | Status: SHIPPED | OUTPATIENT
Start: 2019-05-22 | End: 2019-08-15 | Stop reason: SDUPTHER

## 2019-05-22 RX ORDER — LISINOPRIL 10 MG/1
10 TABLET ORAL DAILY
Qty: 30 TAB | Refills: 3 | Status: SHIPPED | OUTPATIENT
Start: 2019-05-22 | End: 2019-08-20 | Stop reason: DRUGHIGH

## 2019-05-22 NOTE — PATIENT INSTRUCTIONS
The [facility] reminders! Foundation Operating Hours: These may change without notice. Mon- Wed 7am to 5pm. Closed for lunch 12-1pm  Thurs 7am to 12pm  Fridays closed     Office number:     Have Insurance? ?  You can still come to the [facility]. You will not have to find a new provider!!    If you have medical insurance:    1) You will need to have all labs drawn at [facility] 1-2 weeks prior to your follow up appointment with your provider. Enter the hospital through the main entrance, walk around the circular desk and sign in. They will call your name when it is time to have your labs drawn. 2) No longer be able to obtain medications through our [facility] program. All medications will be sent to an outside pharmacy of your choice. **In case of inclement weather (snow and/or ice) please do not try to come into the office for your appointment. Please call in and you will not be counted as a \"No Show. \" SAFETY FIRST!!**    NO SHOW POLICY ~ If a patient has 3 no shows for an appointment with their Provider, Mental Health Provider, or the Nurse Navigator, they will be discharged from the practice for 6 months. Medication ordering will also be suspended. If the patient is discharged from the [facility], they can go to the [place] [address] or [address] [place] [address] where they can be seen for their primary care needs plus obtain the same type of medications as they received at the [facility]. To avoid being discharged the patient must call the office at [phone] 24 hours prior to their appointment if they need to cancel or reschedule, arrive to their appointments on time (preferrably 15 minutes early) (If you are late you will not be seen) and come to all scheduled appointments with the provider, mental health, and/or nurse navigator. If the patient is discharged from the practice, they can apply to be re-established after 6 months.      Lab work:    Unless you are instructed differently, please return to the office between the hours of 7 am and 11:00 am Monday through Thursday to have your labs drawn one to two weeks before your next scheduled PROVIDER appointment. If you do not have an appointment to follow up on these results, please make one or plan to call the office if you do not hear from us to get the results. No news does not mean good news. Medications:   Medication  times are firm:  Mondays and Tuesdays from 1pm-5pm  Wednesdays and Thursdays from 8am-11:30am  These hours are subject to change without notice. The [facility] or [facility] will assist you with your medications when available as not all medications can be obtained through this program. Medications are added and deleted from the [address] as deemed necessary by the pharmaceutical companies. There is a chance that a medication you currently receive from [facility] may be discontinued. If this occurs an alternative medication will be sent to a local pharmacy for you to obtain and pay for. If your medications are new or have changed, and you get your medications from the [address] [place] [address]), you MUST talk to the pharmacy staff to sign the new prescription applications. If you don't sign the applications we cannot get the medications for you. It usually takes 6-8 weeks for your medications to arrive. The Pharmacy staff will call you when your medications are available. If you don't hear from them you call [phone] and inquire about your medicines. You are responsible for obtaining your medications. You will have 30 days to come in and  your medications. If you don't  your medicines within those 30 days, those medicines may be placed on the self as samples and you will have to start all over again by completing the applications and waiting the 6-8 weeks for your medicines to arrive.     Foot Care: Local [facility] through [facility] ([address])  Every second Tuesday of the month (except for holidays and election days) from 9am to 1 pm. The services provided by these ministry volunteers are free of charge with the option to donate. They will inspect your feet thoroughly, soak them for 10 minutes, cut and file your nails. They care for diabetics as well. Keep in mind this service is free and will be on a first come first serve basis. Bad teeth? Ask about the Dental Clinic to get you in front of a local dentist when a dentist is available. They only extract teeth. No fillings, root canals, or dentures. The bus leaves the second Thursday of the month for those on the list. (Ask about availability as these appointments are limited). If you are scheduled for the dentist and you fail to come into the Foundation to meet the bus, you WILL NOT be placed on the dental list again. IMPORTANT: if you have high blood pressure please take your blood pressure  medications before arriving to the Foundation. If your blood pressure is elevated  the dental clinic WILL NOT extract any teeth and you will not have another  opportunity to go to the clinic. DIABETES:   Do you have uncontrolled diabetes or you just want to learn more about your diabetes? Schedule with the Nurse navigator for our new 5-week Diabetes program. You will learn how to properly manage your diabetes: nutrition, exercise, medication therapy. Eye exams for Diabetics. Please let us know so we can add you to the list to see an eye doctor. These  appointments are limited. You will receive a free eye exam and free glasses if  needed. Unfortunately, if you are not a diabetic, we do not have a free service  for eye exams for you (yet!). We do have information on where to  go to get a  huge discount on eye exams and glasses. Sick visits: If you are sick and it is not an emergency call the office to schedule an appointment to see the provider.  Care in the office is FREE but charges will be applied if you go to the Emergency room. If you feel better and do not wish to attend your sick appointment please call  the office and cancel to avoid a no-show. Charges and cost items from the Foundation:    Most of our orders are covered by [address] but there ARE SOME CHARGES for items such as radiology interpretations and anesthesiology during procedures and surgeries that are not covered under [facility]. [facility]   Please make sure you have contacted [facility] to check on your payment options:   1 [phone] Mon - Fri 8-4pm. It is important that you are screened in order to qualify for assistance and to avoid huge medical charges. This service is to benefit you. The [facility] is not responsible for ANY charges you may accrue regardless of who ordered the medication, procedure, treatment or test. If you go to the Emergency Room, you WILL be charged! Behavior and emotional issues! It is stressful to be sick, have an illness, take medications, not have a job, not have medical insurance, have family issues or just getting older! Schedule an appointment with our mental health provider. She is in the office Mondays and Wednesdays from 8am to [address] [facility] can also contact the following: The national suicide hotline (2-623-990-YOQP or 0-195.850.2253)    [address], [address] [place] [address] [address] [place] [address]   [phone]    Drug and Alcohol Addiction Issues! It is hard to stop a poor habit but there is help out there. Please feel free to attend any of the following support groups to help you kick the habit or go to [facility] to be evaluated by the psychiatric team. Never give up!!     Kenn meetings: [facility] MONDAY 10:30 AM MERCEDEZ Simon [address] [facility] 8:00 PM [facility] SATURDAY NIGHT [facility]       TID BITS:  Disposing medicines in household trash: Almost all medicines can be thrown into your household trash. These include prescription and over-the-counter (OTC) drugs in pills, liquids, drops, patches, creams, and inhalers. Follow these steps:  1) Remove the drugs from their original containers and mix them with something undesirable, such as used coffee grounds, dirt, or cat litter. This makes the medicine less appealing to children and pets and unrecognizable to someone who might intentionally go through the trash looking for drugs. 2) Put the mixture in something you can close (a re-sealable zipper storage bag, empty can, or other container) to prevent the drug from leaking or spilling out. 3) Throw the container in the garbage. 4) Scratch out all your personal information on the empty medicine packaging to protect your identity and privacy. Throw the packaging away. If you have a question about your medicine, ask your health care provider or pharmacist.             Allergies: Care Instructions  Your Care Instructions    Allergies occur when your body's defense system (immune system) overreacts to certain substances. The immune system treats a harmless substance as if it were a harmful germ or virus. Many things can cause this overreaction, including pollens, medicine, food, dust, animal dander, and mold. Allergies can be mild or severe. Mild allergies can be managed with home treatment. But medicine may be needed to prevent problems. Managing your allergies is an important part of staying healthy. Your doctor may suggest that you have allergy testing to help find out what is causing your allergies. When you know what things trigger your symptoms, you can avoid them. This can prevent allergy symptoms and other health problems.   For severe allergies that cause reactions that affect your whole body (anaphylactic reactions), your doctor may prescribe a shot of epinephrine to carry with you in case you have a severe reaction. Learn how to give yourself the shot and keep it with you at all times. Make sure it is not . Follow-up care is a key part of your treatment and safety. Be sure to make and go to all appointments, and call your doctor if you are having problems. It's also a good idea to know your test results and keep a list of the medicines you take. How can you care for yourself at home? · If you have been told by your doctor that dust or dust mites are causing your allergy, decrease the dust around your bed:  ? Wash sheets, pillowcases, and other bedding in hot water every week. ? Use dust-proof covers for pillows, duvets, and mattresses. Avoid plastic covers because they tear easily and do not \"breathe. \" Wash as instructed on the label. ? Do not use any blankets and pillows that you do not need. ? Use blankets that you can wash in your washing machine. ? Consider removing drapes and carpets, which attract and hold dust, from your bedroom. · If you are allergic to house dust and mites, do not use home humidifiers. Your doctor can suggest ways you can control dust and mites. · Look for signs of cockroaches. Cockroaches cause allergic reactions. Use cockroach baits to get rid of them. Then, clean your home well. Cockroaches like areas where grocery bags, newspapers, empty bottles, or cardboard boxes are stored. Do not keep these inside your home, and keep trash and food containers sealed. Seal off any spots where cockroaches might enter your home. · If you are allergic to mold, get rid of furniture, rugs, and drapes that smell musty. Check for mold in the bathroom. · If you are allergic to outdoor pollen or mold spores, use air-conditioning. Change or clean all filters every month. Keep windows closed. · If you are allergic to pollen, stay inside when pollen counts are high.  Use a vacuum  with a HEPA filter or a double-thickness filter at least two times each week. · Stay inside when air pollution is bad. Avoid paint fumes, perfumes, and other strong odors. · Avoid conditions that make your allergies worse. Stay away from smoke. Do not smoke or let anyone else smoke in your house. Do not use fireplaces or wood-burning stoves. · If you are allergic to your pets, change the air filter in your furnace every month. Use high-efficiency filters. · If you are allergic to pet dander, keep pets outside or out of your bedroom. Old carpet and cloth furniture can hold a lot of animal dander. You may need to replace them. When should you call for help? Give an epinephrine shot if:    · You think you are having a severe allergic reaction.     · You have symptoms in more than one body area, such as mild nausea and an itchy mouth.    After giving an epinephrine shot call 911, even if you feel better.   Call 911 if:    · You have symptoms of a severe allergic reaction. These may include:  ? Sudden raised, red areas (hives) all over your body. ? Swelling of the throat, mouth, lips, or tongue. ? Trouble breathing. ? Passing out (losing consciousness). Or you may feel very lightheaded or suddenly feel weak, confused, or restless.     · You have been given an epinephrine shot, even if you feel better.    Call your doctor now or seek immediate medical care if:    · You have symptoms of an allergic reaction, such as:  ? A rash or hives (raised, red areas on the skin). ? Itching. ? Swelling. ? Belly pain, nausea, or vomiting.    Watch closely for changes in your health, and be sure to contact your doctor if:    · You do not get better as expected. Where can you learn more? Go to http://rc-johanny.info/. Enter F712 in the search box to learn more about \"Allergies: Care Instructions. \"  Current as of: June 27, 2018  Content Version: 11.9  © 6777-7875 [employer].  Care instructions adapted under license by [employer] (which disclaims liability or warranty for this information). If you have questions about a medical condition or this instruction, always ask your healthcare professional. [address] any warranty or liability for your use of this information.

## 2019-05-24 ENCOUNTER — HOSPITAL ENCOUNTER (OUTPATIENT)
Dept: LAB | Age: 59
Discharge: HOME | End: 2019-05-24

## 2019-05-24 LAB — XX-LABCORP SPECIMEN COL,LCBCF: NORMAL

## 2019-05-24 PROCEDURE — 99001 SPECIMEN HANDLING PT-LAB: CPT

## 2019-05-25 LAB
ALBUMIN SERPL-MCNC: 4.4 G/DL (ref 3.5–5.5)
ALBUMIN/GLOB SERPL: 1.3 {RATIO} (ref 1.2–2.2)
ALP SERPL-CCNC: 133 IU/L (ref 39–117)
ALT SERPL-CCNC: 10 IU/L (ref 0–44)
AST SERPL-CCNC: 19 IU/L (ref 0–40)
BILIRUB SERPL-MCNC: 0.4 MG/DL (ref 0–1.2)
BUN SERPL-MCNC: 9 MG/DL (ref 6–24)
BUN/CREAT SERPL: 9 (ref 9–20)
CALCIUM SERPL-MCNC: 9.6 MG/DL (ref 8.7–10.2)
CHLORIDE SERPL-SCNC: 98 MMOL/L (ref 96–106)
CHOLEST SERPL-MCNC: 138 MG/DL (ref 100–199)
CO2 SERPL-SCNC: 24 MMOL/L (ref 20–29)
CREAT SERPL-MCNC: 1.01 MG/DL (ref 0.76–1.27)
GLOBULIN SER CALC-MCNC: 3.3 G/DL (ref 1.5–4.5)
GLUCOSE SERPL-MCNC: 120 MG/DL (ref 65–99)
HDLC SERPL-MCNC: 38 MG/DL
LDLC SERPL CALC-MCNC: 82 MG/DL (ref 0–99)
POTASSIUM SERPL-SCNC: 5.1 MMOL/L (ref 3.5–5.2)
PROT SERPL-MCNC: 7.7 G/DL (ref 6–8.5)
SODIUM SERPL-SCNC: 137 MMOL/L (ref 134–144)
SPECIMEN STATUS REPORT, ROLRST: NORMAL
T3FREE SERPL-MCNC: 2.8 PG/ML (ref 2–4.4)
T4 FREE SERPL-MCNC: 0.84 NG/DL (ref 0.82–1.77)
TRIGL SERPL-MCNC: 89 MG/DL (ref 0–149)
TSH SERPL DL<=0.005 MIU/L-ACNC: 9.87 UIU/ML (ref 0.45–4.5)
VLDLC SERPL CALC-MCNC: 18 MG/DL (ref 5–40)

## 2019-05-30 RX ORDER — LEVOTHYROXINE SODIUM 50 UG/1
50 TABLET ORAL
Qty: 30 TAB | Refills: 3 | Status: SHIPPED | OUTPATIENT
Start: 2019-05-30 | End: 2019-08-22 | Stop reason: SDUPTHER

## 2019-05-30 NOTE — PROGRESS NOTES
Addie, please call pt with the following:  CMP good   to 82;  to 89. Continue the cholesterol medication. TSH 10.6 to 9.87. Will initiate synthroid 50mcg every day. Please inform pt he MUST take this medication 45 min PRIOR to any other meds, meals, coffee. He may take this med with water only. Pt will need labs 1-2 weeks prior to his 8/14/19 appt.   Thank you

## 2019-06-03 NOTE — PROGRESS NOTES
Pt returned call. Given lab results as requested. He will  Synthroid and begin taking 45 min before meals and any other meds. He verb understanding and denies questions at time of call.

## 2019-06-17 DIAGNOSIS — E78.5 DYSLIPIDEMIA, GOAL LDL BELOW 100: ICD-10-CM

## 2019-06-17 DIAGNOSIS — E78.1 HYPERTRIGLYCERIDEMIA: ICD-10-CM

## 2019-06-17 RX ORDER — ATORVASTATIN CALCIUM 10 MG/1
TABLET, FILM COATED ORAL
Qty: 30 TAB | Refills: 2 | Status: SHIPPED | OUTPATIENT
Start: 2019-06-17 | End: 2019-08-15 | Stop reason: SDUPTHER

## 2019-06-17 NOTE — TELEPHONE ENCOUNTER
Requested Prescriptions     Signed Prescriptions Disp Refills    atorvastatin (LIPITOR) 10 mg tablet 30 Tab 2     Sig: TAKE 1 TABLET BY MOUTH DAILY FOR CHOLESTEROL     Authorizing Provider: Adryan Swift

## 2019-08-13 DIAGNOSIS — E03.9 HYPOTHYROIDISM, UNSPECIFIED TYPE: ICD-10-CM

## 2019-08-13 DIAGNOSIS — E03.9 HYPOTHYROIDISM, UNSPECIFIED TYPE: Primary | ICD-10-CM

## 2019-08-14 DIAGNOSIS — J30.89 ENVIRONMENTAL AND SEASONAL ALLERGIES: ICD-10-CM

## 2019-08-15 RX ORDER — LISINOPRIL 10 MG/1
10 TABLET ORAL DAILY
Qty: 30 TAB | Refills: 3 | Status: CANCELLED | OUTPATIENT
Start: 2019-08-15

## 2019-08-15 RX ORDER — LEVOTHYROXINE SODIUM 50 UG/1
50 TABLET ORAL
Qty: 30 TAB | Refills: 3 | Status: CANCELLED | OUTPATIENT
Start: 2019-08-15

## 2019-08-15 RX ORDER — FLUTICASONE PROPIONATE 50 MCG
2 SPRAY, SUSPENSION (ML) NASAL
Qty: 1 BOTTLE | Refills: 2 | Status: SHIPPED | OUTPATIENT
Start: 2019-08-15

## 2019-08-15 NOTE — TELEPHONE ENCOUNTER
Requested Prescriptions     Signed Prescriptions Disp Refills    fluticasone propionate (FLONASE) 50 mcg/actuation nasal spray 1 Bottle 2     Si Sprays by Both Nostrils route daily as needed for Rhinitis or Allergies.      Authorizing Provider: Lotus Bahena     Pt has follow up appt 19

## 2019-08-19 ENCOUNTER — TELEPHONE (OUTPATIENT)
Dept: FAMILY MEDICINE CLINIC | Age: 59
End: 2019-08-19

## 2019-08-20 ENCOUNTER — HOSPITAL ENCOUNTER (OUTPATIENT)
Dept: LAB | Age: 59
Discharge: HOME | End: 2019-08-20

## 2019-08-20 ENCOUNTER — OFFICE VISIT (OUTPATIENT)
Dept: FAMILY MEDICINE CLINIC | Age: 59
End: 2019-08-20

## 2019-08-20 VITALS
RESPIRATION RATE: 22 BRPM | HEART RATE: 87 BPM | WEIGHT: 184.8 LBS | DIASTOLIC BLOOD PRESSURE: 95 MMHG | BODY MASS INDEX: 29 KG/M2 | HEIGHT: 67 IN | OXYGEN SATURATION: 95 % | SYSTOLIC BLOOD PRESSURE: 146 MMHG | TEMPERATURE: 97.9 F

## 2019-08-20 DIAGNOSIS — Z13.9 SCREENING PROCEDURE: ICD-10-CM

## 2019-08-20 DIAGNOSIS — E03.9 HYPOTHYROIDISM, UNSPECIFIED TYPE: ICD-10-CM

## 2019-08-20 DIAGNOSIS — I10 ESSENTIAL HYPERTENSION: Primary | ICD-10-CM

## 2019-08-20 DIAGNOSIS — E78.5 DYSLIPIDEMIA, GOAL LDL BELOW 100: ICD-10-CM

## 2019-08-20 DIAGNOSIS — E78.1 HYPERTRIGLYCERIDEMIA: ICD-10-CM

## 2019-08-20 DIAGNOSIS — Z71.89 COUNSELING ON HEALTH PROMOTION AND DISEASE PREVENTION: ICD-10-CM

## 2019-08-20 DIAGNOSIS — R00.0 TACHYCARDIA: ICD-10-CM

## 2019-08-20 LAB — XX-LABCORP SPECIMEN COL,LCBCF: NORMAL

## 2019-08-20 PROCEDURE — 99001 SPECIMEN HANDLING PT-LAB: CPT

## 2019-08-20 RX ORDER — AMLODIPINE BESYLATE 10 MG/1
10 TABLET ORAL DAILY
Qty: 30 TAB | Refills: 3 | Status: SHIPPED | OUTPATIENT
Start: 2019-08-20

## 2019-08-20 RX ORDER — METOPROLOL TARTRATE 25 MG/1
25 TABLET, FILM COATED ORAL 2 TIMES DAILY
Qty: 60 TAB | Refills: 3 | Status: SHIPPED | OUTPATIENT
Start: 2019-08-20 | End: 2019-09-11 | Stop reason: DRUGHIGH

## 2019-08-20 RX ORDER — ATORVASTATIN CALCIUM 10 MG/1
10 TABLET, FILM COATED ORAL DAILY
Qty: 30 TAB | Refills: 3 | Status: SHIPPED | OUTPATIENT
Start: 2019-08-20

## 2019-08-20 RX ORDER — LISINOPRIL AND HYDROCHLOROTHIAZIDE 10; 12.5 MG/1; MG/1
1 TABLET ORAL DAILY
Qty: 30 TAB | Refills: 0 | Status: SHIPPED | OUTPATIENT
Start: 2019-08-20 | End: 2019-09-11 | Stop reason: SDUPTHER

## 2019-08-20 NOTE — PATIENT INSTRUCTIONS
DASH Diet: Care Instructions  Your Care Instructions    The DASH diet is an eating plan that can help lower your blood pressure. DASH stands for Dietary Approaches to Stop Hypertension. Hypertension is high blood pressure. The DASH diet focuses on eating foods that are high in calcium, potassium, and magnesium. These nutrients can lower blood pressure. The foods that are highest in these nutrients are fruits, vegetables, low-fat dairy products, nuts, seeds, and legumes. But taking calcium, potassium, and magnesium supplements instead of eating foods that are high in those nutrients does not have the same effect. The DASH diet also includes whole grains, fish, and poultry. The DASH diet is one of several lifestyle changes your doctor may recommend to lower your high blood pressure. Your doctor may also want you to decrease the amount of sodium in your diet. Lowering sodium while following the DASH diet can lower blood pressure even further than just the DASH diet alone. Follow-up care is a key part of your treatment and safety. Be sure to make and go to all appointments, and call your doctor if you are having problems. It's also a good idea to know your test results and keep a list of the medicines you take. How can you care for yourself at home? Following the DASH diet  · Eat 4 to 5 servings of fruit each day. A serving is 1 medium-sized piece of fruit, ½ cup chopped or canned fruit, 1/4 cup dried fruit, or 4 ounces (½ cup) of fruit juice. Choose fruit more often than fruit juice. · Eat 4 to 5 servings of vegetables each day. A serving is 1 cup of lettuce or raw leafy vegetables, ½ cup of chopped or cooked vegetables, or 4 ounces (½ cup) of vegetable juice. Choose vegetables more often than vegetable juice. · Get 2 to 3 servings of low-fat and fat-free dairy each day. A serving is 8 ounces of milk, 1 cup of yogurt, or 1 ½ ounces of cheese. · Eat 6 to 8 servings of grains each day.  A serving is 1 slice of bread, 1 ounce of dry cereal, or ½ cup of cooked rice, pasta, or cooked cereal. Try to choose whole-grain products as much as possible. · Limit lean meat, poultry, and fish to 2 servings each day. A serving is 3 ounces, about the size of a deck of cards. · Eat 4 to 5 servings of nuts, seeds, and legumes (cooked dried beans, lentils, and split peas) each week. A serving is 1/3 cup of nuts, 2 tablespoons of seeds, or ½ cup of cooked beans or peas. · Limit fats and oils to 2 to 3 servings each day. A serving is 1 teaspoon of vegetable oil or 2 tablespoons of salad dressing. · Limit sweets and added sugars to 5 servings or less a week. A serving is 1 tablespoon jelly or jam, ½ cup sorbet, or 1 cup of lemonade. · Eat less than 2,300 milligrams (mg) of sodium a day. If you limit your sodium to 1,500 mg a day, you can lower your blood pressure even more. Tips for success  · Start small. Do not try to make dramatic changes to your diet all at once. You might feel that you are missing out on your favorite foods and then be more likely to not follow the plan. Make small changes, and stick with them. Once those changes become habit, add a few more changes. · Try some of the following:  ? Make it a goal to eat a fruit or vegetable at every meal and at snacks. This will make it easy to get the recommended amount of fruits and vegetables each day. ? Try yogurt topped with fruit and nuts for a snack or healthy dessert. ? Add lettuce, tomato, cucumber, and onion to sandwiches. ? Combine a ready-made pizza crust with low-fat mozzarella cheese and lots of vegetable toppings. Try using tomatoes, squash, spinach, broccoli, carrots, cauliflower, and onions. ? Have a variety of cut-up vegetables with a low-fat dip as an appetizer instead of chips and dip. ? Sprinkle sunflower seeds or chopped almonds over salads. Or try adding chopped walnuts or almonds to cooked vegetables.   ? Try some vegetarian meals using beans and peas. Add garbanzo or kidney beans to salads. Make burritos and tacos with mashed jules beans or black beans. Where can you learn more? Go to http://rc-johanny.info/. Enter X761 in the search box to learn more about \"DASH Diet: Care Instructions. \"  Current as of: July 22, 2018  Content Version: 12.1  © 2211-7233 [employer]. Care instructions adapted under license by [employer] (which disclaims liability or warranty for this information). If you have questions about a medical condition or this instruction, always ask your healthcare professional. [address] any warranty or liability for your use of this information. High Blood Pressure: Care Instructions  Overview    It's normal for blood pressure to go up and down throughout the day. But if it stays up, you have high blood pressure. Another name for high blood pressure is hypertension. Despite what a lot of people think, high blood pressure usually doesn't cause headaches or make you feel dizzy or lightheaded. It usually has no symptoms. But it does increase your risk of stroke, heart attack, and other problems. You and your doctor will talk about your risks of these problems based on your blood pressure. Your doctor will give you a goal for your blood pressure. Your goal will be based on your health and your age. Lifestyle changes, such as eating healthy and being active, are always important to help lower blood pressure. You might also take medicine to reach your blood pressure goal.  Follow-up care is a key part of your treatment and safety. Be sure to make and go to all appointments, and call your doctor if you are having problems. It's also a good idea to know your test results and keep a list of the medicines you take. How can you care for yourself at home? Medical treatment  · If you stop taking your medicine, your blood pressure will go back up.  You may take one or more types of medicine to lower your blood pressure. Be safe with medicines. Take your medicine exactly as prescribed. Call your doctor if you think you are having a problem with your medicine. · Talk to your doctor before you start taking aspirin every day. Aspirin can help certain people lower their risk of a heart attack or stroke. But taking aspirin isn't right for everyone, because it can cause serious bleeding. · See your doctor regularly. You may need to see the doctor more often at first or until your blood pressure comes down. · If you are taking blood pressure medicine, talk to your doctor before you take decongestants or anti-inflammatory medicine, such as ibuprofen. Some of these medicines can raise blood pressure. · Learn how to check your blood pressure at home. Lifestyle changes  · Stay at a healthy weight. This is especially important if you put on weight around the waist. Losing even 10 pounds can help you lower your blood pressure. · If your doctor recommends it, get more exercise. Walking is a good choice. Bit by bit, increase the amount you walk every day. Try for at least 30 minutes on most days of the week. You also may want to swim, bike, or do other activities. · Avoid or limit alcohol. Talk to your doctor about whether you can drink any alcohol. · Try to limit how much sodium you eat to less than 2,300 milligrams (mg) a day. Your doctor may ask you to try to eat less than 1,500 mg a day. · Eat plenty of fruits (such as bananas and oranges), vegetables, legumes, whole grains, and low-fat dairy products. · Lower the amount of saturated fat in your diet. Saturated fat is found in animal products such as milk, cheese, and meat. Limiting these foods may help you lose weight and also lower your risk for heart disease. · Do not smoke. Smoking increases your risk for heart attack and stroke. If you need help quitting, talk to your doctor about stop-smoking programs and medicines.  These can increase your chances of quitting for good. When should you call for help? Call 911 anytime you think you may need emergency care. This may mean having symptoms that suggest that your blood pressure is causing a serious heart or blood vessel problem. Your blood pressure may be over 180/120.   For example, call 911 if:    · You have symptoms of a heart attack. These may include:  ? Chest pain or pressure, or a strange feeling in the chest.  ? Sweating. ? Shortness of breath. ? Nausea or vomiting. ? Pain, pressure, or a strange feeling in the back, neck, jaw, or upper belly or in one or both shoulders or arms. ? Lightheadedness or sudden weakness. ? A fast or irregular heartbeat.     · You have symptoms of a stroke. These may include:  ? Sudden numbness, tingling, weakness, or loss of movement in your face, arm, or leg, especially on only one side of your body. ? Sudden vision changes. ? Sudden trouble speaking. ? Sudden confusion or trouble understanding simple statements. ? Sudden problems with walking or balance. ? A sudden, severe headache that is different from past headaches.     · You have severe back or belly pain.    Do not wait until your blood pressure comes down on its own. Get help right away.   Call your doctor now or seek immediate care if:    · Your blood pressure is much higher than normal (such as 180/120 or higher), but you don't have symptoms.     · You think high blood pressure is causing symptoms, such as:  ? Severe headache.  ? Blurry vision.    Watch closely for changes in your health, and be sure to contact your doctor if:    · Your blood pressure measures higher than your doctor recommends at least 2 times. That means the top number is higher or the bottom number is higher, or both.     · You think you may be having side effects from your blood pressure medicine. Where can you learn more? Go to http://rc-johanny.info/.   Enter Q659 in the search box to learn more about \"High Blood Pressure: Care Instructions. \"  Current as of: July 22, 2018  Content Version: 12.1  © 2463-7415 [employer]. Care instructions adapted under license by [employer] (which disclaims liability or warranty for this information). If you have questions about a medical condition or this instruction, always ask your healthcare professional. [address] any warranty or liability for your use of this information. Learning About Diuretics for High Blood Pressure  Introduction  Diuretics help to lower blood pressure. This reduces your risk of a heart attack and stroke. It also reduces your risk of kidney disease. Diuretics cause your kidneys to remove sodium and water. They also relax the blood vessel walls. These help lower your blood pressure. Examples  · Chlorthalidone  · Hydrochlorothiazide  Possible side effects  There are some common side effects. They are:  · Too little potassium. · Feeling dizzy. · Rash. · Urinating a lot. · High blood sugar. (But this is not common.)  You may have other side effects. Check the information that comes with your medicine. What to know about taking this medicine  · You may take other medicines for blood pressure. Diuretics can help those work better. They can also prevent extra fluid in your body. · You may need to take potassium pills. Or you may have to watch how much potassium is in your food. Ask your doctor about this. · You may need blood tests to check your kidneys and your potassium level. · Take your medicines exactly as prescribed. Call your doctor if you think you are having a problem with your medicine. · Check with your doctor or pharmacist before you use any other medicines. This includes over-the-counter medicines. Make sure your doctor knows all of the medicines, vitamins, herbal products, and supplements you take. Taking some medicines together can cause problems.   Where can you learn more? Go to http://rc-johanny.info/. Enter N164 in the search box to learn more about \"Learning About Diuretics for High Blood Pressure. \"  Current as of: July 22, 2018  Content Version: 12.1  © 6107-5868 [employer]. Care instructions adapted under license by [employer] (which disclaims liability or warranty for this information). If you have questions about a medical condition or this instruction, always ask your healthcare professional. [place] [address] any warranty or liability for your use of this information.

## 2019-08-20 NOTE — PROGRESS NOTES
Subjective:     Sheng Castillo is a 66 y.o. male who presents for follow up of hypertension. Diet and Lifestyle: generally follows a low fat low cholesterol diet, generally follows a low sodium diet, exercises sporadically, smoker cigarettes  Home BP Monitoring: is not measured at home    Cardiovascular ROS: taking medications as instructed, no medication side effects noted, no TIA's, no chest pain on exertion, no dyspnea on exertion, no swelling of ankles. New concerns: none. Pt states he has cut back on sodium and microwavable foods. Pt states \"I didn't realize how much sodium was in frozen foods. \" Pt denies any complaints or issues. Patient Active Problem List   Diagnosis Code    Hypertriglyceridemia E78.1    Dyslipidemia, goal LDL below 100 E78.5    Positive FIT (fecal immunochemical test) R19.5         Review of Systems, additional:  Pertinent items are noted in HPI. Objective:     Visit Vitals  /88   Pulse 94   Temp 98 °F (36.7 °C)   Resp 20   Ht 5' 7\" (1.702 m)   Wt 183 lb 6.4 oz (83.2 kg)   SpO2 98%   BMI 28.72 kg/m²     Appearance: alert, well appearing, and in no distress, oriented to person, place, and time, overweight, improved and well hydrated. General exam: CVS exam BP noted to be well controlled today in office, S1, S2 normal, no gallop, no murmur, chest clear, no JVD, no HSM, no edema. Lab review: no lab studies available for review at time of visit. Assessment/Plan:     1. Essential hypertension    8/20/19 Note:  Essential hypertension  Pts BP remains elevated despite taking amlodipine 10mg, lisinopril 10mg, and metoprolol 25mg BID. Will place lisinopril 10mg on hold for now (marked pts RX bottle to hold off on taking medication). Will add prinzide to other BP med regimen. Pt to return in 2 weeks for BP recheck. If BP remains elevated, will then consider prinzide 20/12.5.     - metoprolol tartrate (LOPRESSOR) 50 mg tablet; Take 1 Tab by mouth two (2) times a day.  For blood pressure and heart rate  Dispense: 60 Tab; Refill: 2  hypertension stable, improved. - lisinopril-hydroCHLOROthiazide (PRINZIDE, ZESTORETIC) 10-12.5 mg per tablet; Take 1 Tab by mouth daily. For blood pressure  Dispense: 30 Tab; Refill: 2    reviewed diet, exercise and weight control  recommended sodium restriction  the following changes are made - increase metoprolol from 25mg BID to 50mg BID to assist with BP and HR. pt to continue amlodipine 10mg qd and prinzide 10/12.5mg qd. .     2. Tachycardia  HR remains in the 90's. - metoprolol tartrate (LOPRESSOR) 50 mg tablet; Take 1 Tab by mouth two (2) times a day. For blood pressure and heart rate  Dispense: 60 Tab; Refill: 2    3. Positive FIT (fecal immunochemical test)  Catarino Mina spoke with pts sister in reference to following up with Dr Rosemarie Frances office due to pts +Fit. 4. Current smoker  Counseled patient on the dangers of tobacco use, and was advised to quit. Reviewed strategies to maximize success, including the use of Chantix. Discussed the risks of continued tobacco use such as elevated blood pressure, vascular irritation with increased incidence of CVD with stroke or MI and PVD causing claudication, lung damage that could lead to COPD, cancer and death. Encouraged an approach to find a few healthy habits, write them down then plan to decrease their cigarette use by one each week till they are gone all together and to plan for stress that may cause them to want to restart and how to prevent it by having new coping mechanisms in place.     1-800-QUIT-NOW provided for counseling       Face to Face time: 25 min

## 2019-08-20 NOTE — PROGRESS NOTES
[address]  [address]  [phone] office/[phone] fax      8/20/2019    Reason for visit:   Chief Complaint   Patient presents with    Follow Up Chronic Condition       Patient: Marcelo Carranza, 1960, [id number]       Primary MD: Felisa Ludwig NP    Subjective:   Marcelo Carranza, a 66 y.o. male, who presents for Follow Up Chronic Condition      Past Medical History:   Diagnosis Date    Abnormal liver enzymes 2019    Enlarged liver 2019    Hypertension     Positive FIT (fecal immunochemical test) 03/2019       No past surgical history on file. Social History     Socioeconomic History    Marital status:      Spouse name: Not on file    Number of children: 0    Years of education: 8    Highest education level: 8th grade   Occupational History    Occupation: Unemployed   Social Needs    Financial resource strain: Not on file    Food insecurity:     Worry: Not on file     Inability: Not on file   Inbox needs:     Medical: Not on file     Non-medical: Not on file   Tobacco Use    Smoking status: Current Every Day Smoker     Packs/day: 0.50     Years: 39.00     Pack years: 19.50    Smokeless tobacco: Never Used   Substance and Sexual Activity    Alcohol use:  Yes     Alcohol/week: 2.0 standard drinks     Types: 2 Cans of beer per week     Frequency: 2-3 times a week     Drinks per session: 1 or 2     Comment: has cut down x 2 wks to 2 beers a week    Drug use: No    Sexual activity: Not Currently   Lifestyle    Physical activity:     Days per week: 5 days     Minutes per session: 30 min    Stress: Rather much   Relationships    Social connections:     Talks on phone: Not on file     Gets together: Not on file     Attends Holiness service: Not on file     Active member of club or organization: Not on file     Attends meetings of clubs or organizations: Not on file     Relationship status: Not on file    Intimate partner violence:     Fear of current or ex partner: Not on file     Emotionally abused: Not on file     Physically abused: Not on file     Forced sexual activity: Not on file   Other Topics Concern     Service No    Blood Transfusions No    Caffeine Concern Yes    Occupational Exposure No    Hobby Hazards No    Sleep Concern Yes    Stress Concern Yes    Weight Concern No    Special Diet No    Back Care No    Exercise Yes    Bike Helmet No    Seat Belt Yes    Self-Exams Yes   Social History Narrative    ** Merged History Encounter **            No Known Allergies    Current Outpatient Medications on File Prior to Visit   Medication Sig Dispense Refill    fluticasone propionate (FLONASE) 50 mcg/actuation nasal spray 2 Sprays by Both Nostrils route daily as needed for Rhinitis or Allergies. 1 Bottle 2    levothyroxine (SYNTHROID) 50 mcg tablet Take 1 Tab by mouth Daily (before breakfast). For thyroid 30 Tab 3    aspirin 81 mg chewable tablet Take 1 Tab by mouth daily. For heart health 30 Tab 11     No current facility-administered medications on file prior to visit. Pt comes in today with his sister for his 3 month routine follow up. Pt states he is taking all meds as prescribed. Pt states he wakes in the am and takes the synthroid then he goes back to bed for a few hours and gets up to take the rest of his meds. Pt denies any SOB, chest pain, edema, GI/ issues. Pt states he takes his BP meds daily. He admits to continued smoking of cigs and consuming microwaveable meals. Pt states his mother takes the phone off the hook all the time because solicitors call the house all day. Sister requested pts new medication (Prinzide) be sent to [facility] pharm this time only because they are going to [facility] lab to have thy levels drawn and she does not want to stop at different places. Sister requested all other meds be sent to [facility] in [place].       Objective:   Visit Vitals  BP (!) 146/95 (BP 1 Location: Left arm, BP Patient Position: Sitting)   Pulse 87   Temp 97.9 °F (36.6 °C) (Oral)   Resp 22   Ht 5' 7\" (1.702 m)   Wt 184 lb 12.8 oz (83.8 kg)   SpO2 95%   BMI 28.94 kg/m²      Wt Readings from Last 3 Encounters:   08/20/19 184 lb 12.8 oz (83.8 kg)   05/22/19 189 lb 9.6 oz (86 kg)   02/20/19 201 lb 9.6 oz (91.4 kg)     Lab Results   Component Value Date/Time    Glucose 120 (H) 05/24/2019 12:00 AM       Physical Exam   Constitutional: He is oriented to person, place, and time. He appears well-developed and well-nourished. HENT:   Head: Normocephalic and atraumatic. Neck: Normal range of motion. Neck supple. Cardiovascular: Normal rate, regular rhythm and normal heart sounds. Pulmonary/Chest: Effort normal and breath sounds normal. No respiratory distress. He has no wheezes. He has no rales. Musculoskeletal: Normal range of motion. Neurological: He is alert and oriented to person, place, and time. Skin: Skin is warm and dry. Psychiatric: He has a normal mood and affect. His behavior is normal. Judgment and thought content normal.       Assessment:    Kiah Rosario who has risk factors including (see above previous medical hx) and:     1. Essential hypertension  Pts BP remains elevated despite taking amlodipine 10mg, lisinopril 10mg, and metoprolol 25mg BID. Will place lisinopril 10mg on hold for now (marked pts RX bottle to hold off on taking medication). Will add prinzide to other BP med regimen. Pt to return in 2 weeks for BP recheck. If BP remains elevated, will then consider prinzide 20/12. 5. Reviewed current value and goal, discussed dietary and stress changes that would help, discussed medications with correct way to take them and side effects that are typical and those that should be reported such as near syncope, ankle edema, rash or shortness of breath. Discussed consequences of poor management with vascular stress, increased occurrence of CVA and MI leading to death.      - amLODIPine (NORVASC) 10 mg tablet; Take 1 Tab by mouth daily. For blood pressure  Dispense: 30 Tab; Refill: 3  - lisinopril-hydroCHLOROthiazide (PRINZIDE, ZESTORETIC) 10-12.5 mg per tablet; Take 1 Tab by mouth daily. For blood pressure  Dispense: 30 Tab; Refill: 0  - REFERRAL TO PRIMARY CARE    2. Tachycardia    - metoprolol tartrate (LOPRESSOR) 25 mg tablet; Take 1 Tab by mouth two (2) times a day. For heart rate  Dispense: 60 Tab; Refill: 3  - REFERRAL TO PRIMARY CARE    3. Dyslipidemia, goal LDL below 100    - atorvastatin (LIPITOR) 10 mg tablet; Take 1 Tab by mouth daily. Dispense: 30 Tab; Refill: 3  - REFERRAL TO PRIMARY CARE    4. Hypertriglyceridemia    - atorvastatin (LIPITOR) 10 mg tablet; Take 1 Tab by mouth daily. Dispense: 30 Tab; Refill: 3  - REFERRAL TO PRIMARY CARE    5. Hypothyroidism, unspecified type  Awaiting results.     - REFERRAL TO PRIMARY CARE    6. Screening procedure  Pt did not have a Hepatitis check during his initial visit. Pt to have drawn at [facility] lab today. - HEPATITIS PANEL, ACUTE; Future    7. Counseling on health promotion and disease prevention  Pt educated on the importance of obtaining the pneumonia, shingles, and flu vaccines and instructed patient to contact pharmacy to inquire if insurance will pay for it. Medication Alternative:  N/a    Written instructions followed our verbal discussion of all information discussed above, pending tests ordered and future goals/plans. Patient expressed understanding of current diagnosis, planned testing, follow up and if needed to contact the office for any questions or concerns prior to the next visit. Plan:   Med reconciliation completed with patient. Reviewed side effects of medications with the patient. Questions were answered and patient verb understanding.       Discussed lab results with patient  didn't do thy levels, needs hepatitis-pt to go to lab now to have drawn      Orders Placed This [address], ACUTE     Standing Status:   Future     Standing Expiration Date:   9/30/2019    REFERRAL TO PRIMARY CARE     Referral Priority:   Routine     Referral Type:   Consultation     Referral Reason:   Specialty Services Required     Referred to Provider:   José Kumar MD     Number of Visits Requested:   1    amLODIPine (NORVASC) 10 mg tablet     Sig: Take 1 Tab by mouth daily. For blood pressure     Dispense:  30 Tab     Refill:  3    atorvastatin (LIPITOR) 10 mg tablet     Sig: Take 1 Tab by mouth daily. Dispense:  30 Tab     Refill:  3    metoprolol tartrate (LOPRESSOR) 25 mg tablet     Sig: Take 1 Tab by mouth two (2) times a day. For heart rate     Dispense:  60 Tab     Refill:  3    lisinopril-hydroCHLOROthiazide (PRINZIDE, ZESTORETIC) 10-12.5 mg per tablet     Sig: Take 1 Tab by mouth daily. For blood pressure     Dispense:  30 Tab     Refill:  0     Current Outpatient Medications   Medication Sig Dispense Refill    amLODIPine (NORVASC) 10 mg tablet Take 1 Tab by mouth daily. For blood pressure 30 Tab 3    atorvastatin (LIPITOR) 10 mg tablet Take 1 Tab by mouth daily. 30 Tab 3    metoprolol tartrate (LOPRESSOR) 25 mg tablet Take 1 Tab by mouth two (2) times a day. For heart rate 60 Tab 3    lisinopril-hydroCHLOROthiazide (PRINZIDE, ZESTORETIC) 10-12.5 mg per tablet Take 1 Tab by mouth daily. For blood pressure 30 Tab 0    fluticasone propionate (FLONASE) 50 mcg/actuation nasal spray 2 Sprays by Both Nostrils route daily as needed for Rhinitis or Allergies. 1 Bottle 2    levothyroxine (SYNTHROID) 50 mcg tablet Take 1 Tab by mouth Daily (before breakfast). For thyroid 30 Tab 3    aspirin 81 mg chewable tablet Take 1 Tab by mouth daily. For heart health 30 Tab 11         No routine 3 month follow up appt needed due to patient having medical insurance. Patient is no longer eligible for care at [facility], [facility] and will need to establish care with a Medicaid approved provider. Referral given. Kel Lynn. [facility] [address], [address] [facility]      I spent 25 minutes with the patient in face-to-face consultation, of which greater than 50% was spent in counseling and coordination of care as described above.

## 2019-08-22 LAB
HAV IGM SERPL QL IA: NEGATIVE
HBV CORE IGM SERPL QL IA: NEGATIVE
HBV SURFACE AG SERPL QL IA: NEGATIVE
HCV AB S/CO SERPL IA: <0.1 S/CO RATIO (ref 0–0.9)
SPECIMEN STATUS REPORT, ROLRST: NORMAL
T3FREE SERPL-MCNC: 3.5 PG/ML (ref 2–4.4)
T4 FREE SERPL-MCNC: 1.07 NG/DL (ref 0.82–1.77)
TSH SERPL DL<=0.005 MIU/L-ACNC: 2.35 UIU/ML (ref 0.45–4.5)

## 2019-08-22 RX ORDER — LEVOTHYROXINE SODIUM 50 UG/1
50 TABLET ORAL
Qty: 30 TAB | Refills: 3 | Status: SHIPPED | OUTPATIENT
Start: 2019-08-22

## 2019-08-22 NOTE — PROGRESS NOTES
Rocio Catherine, please notify patient of the following lab results:  Hepatitis neg  Thy levels are great!! Continue same thyroid medication and continue to take the thyroid medication at least 45 min prior to eating or taking any other meds.

## 2019-08-22 NOTE — PROGRESS NOTES
Belinda Lyle, please notify patient of the following lab results:  Hepatitis neg  Thy levels are great!! Continue same thyroid medication and continue to take the thyroid medication at least 45 min prior to eating or taking any other meds. Hypothyroidism, unspecified type    - REFERRAL TO PRIMARY CARE  - levothyroxine (SYNTHROID) 50 mcg tablet; Take 1 Tab by mouth Daily (before breakfast). For thyroid  Dispense: 30 Tab;  Refill: 3

## 2019-08-27 NOTE — PROGRESS NOTES
Patient sister called the office and lab results given to her. Patient's sister understand that he is negative for hepatitis and his Thyroid levels are great. Patient will continue medications as directed per sister.

## 2019-09-11 ENCOUNTER — OFFICE VISIT (OUTPATIENT)
Dept: FAMILY MEDICINE CLINIC | Age: 59
End: 2019-09-11

## 2019-09-11 VITALS
WEIGHT: 183.4 LBS | TEMPERATURE: 98 F | HEART RATE: 94 BPM | BODY MASS INDEX: 28.79 KG/M2 | RESPIRATION RATE: 20 BRPM | SYSTOLIC BLOOD PRESSURE: 142 MMHG | OXYGEN SATURATION: 98 % | HEIGHT: 67 IN | DIASTOLIC BLOOD PRESSURE: 88 MMHG

## 2019-09-11 DIAGNOSIS — R19.5 POSITIVE FIT (FECAL IMMUNOCHEMICAL TEST): ICD-10-CM

## 2019-09-11 DIAGNOSIS — F17.200 CURRENT SMOKER: ICD-10-CM

## 2019-09-11 DIAGNOSIS — R00.0 TACHYCARDIA: ICD-10-CM

## 2019-09-11 DIAGNOSIS — I10 ESSENTIAL HYPERTENSION: Primary | ICD-10-CM

## 2019-09-11 RX ORDER — LISINOPRIL AND HYDROCHLOROTHIAZIDE 10; 12.5 MG/1; MG/1
1 TABLET ORAL DAILY
Qty: 30 TAB | Refills: 2 | Status: SHIPPED | OUTPATIENT
Start: 2019-09-11

## 2019-09-11 RX ORDER — METOPROLOL TARTRATE 50 MG/1
50 TABLET ORAL 2 TIMES DAILY
Qty: 60 TAB | Refills: 2 | Status: SHIPPED | OUTPATIENT
Start: 2019-09-11

## 2019-09-11 NOTE — PATIENT INSTRUCTIONS
As of July 1st 2019, If you have MEDICAID insurance you can NO LONGER come to the [facility]. You have the option to choose who you want to see for your medical health as long as they are accepting new patients and take your insurance. You may also use the provider that is located on your card. The closet provider office to [facility] is [address] This office is in the [place] [address] next to [facility]. If you chose to go with [facility], please stop at our  and the  will schedule you a 3 month appointment. *[address] [place], [address]              Phone: [phone]        If you decide not to go with [facility], please locate a new provider in the next 1-2 weeks and call our office with the providers name and appointment date. This will allow our office to gather your medical information to transfer to your new provider to help reduce disconnect with your care. If you have MEDICARE insurance, Kiesha Elba is located at [facility] Phone: [phone]. Please don't wait until the last minute to locate your new provider. Remember, you are responsible for your care, so please follow up with your new provider as scheduled.       Take care,  Dr. Adrián Kumari

## 2019-09-11 NOTE — PROGRESS NOTES
Patient's sister requested update on positive FIT results and colonoscopy. Informed her/patient that he was a NO SHOW to nurse visit appt at Dr. Braxton Allen on 4/16/19 and they will need to call and reschedule. Phone number given. Explained to sister/patient that he was seen at [facility] but they are not in network for his insurance and colonoscopy will have to be generated through [facility]. Understanding verified.

## 2019-09-16 ENCOUNTER — NURSE NAVIGATOR (OUTPATIENT)
Dept: FAMILY MEDICINE CLINIC | Age: 59
End: 2019-09-16

## 2019-09-16 NOTE — PROGRESS NOTES
Pt called office this afternoon unsure of medications he is supposed to be taking. Meds verified via phone. \"I'mm supposed to take all of these? \". Pt to take amlodipine, metoprolol and Prinzide along with other meds. Pt denies any other questions.

## 2019-10-16 ENCOUNTER — NURSE NAVIGATOR (OUTPATIENT)
Dept: OTHER | Age: 59
End: 2019-10-16

## 2019-10-25 ENCOUNTER — HOSPITAL ENCOUNTER (OUTPATIENT)
Dept: CT IMAGING | Age: 59
Discharge: HOME | End: 2019-10-25
Attending: FAMILY MEDICINE
Payer: MEDICAID

## 2019-10-25 DIAGNOSIS — F17.200 TOBACCO USE DISORDER: ICD-10-CM

## 2019-10-25 DIAGNOSIS — F17.210 CIGARETTE SMOKER: ICD-10-CM

## 2019-10-25 PROCEDURE — G0297 LDCT FOR LUNG CA SCREEN: HCPCS

## 2019-11-12 ENCOUNTER — APPOINTMENT (OUTPATIENT)
Dept: CT IMAGING | Age: 59
End: 2019-11-12
Attending: FAMILY MEDICINE
Payer: MEDICAID

## 2019-11-12 ENCOUNTER — HOSPITAL ENCOUNTER (OUTPATIENT)
Dept: RESPIRATORY THERAPY | Age: 59
Discharge: HOME | End: 2019-11-12
Attending: FAMILY MEDICINE
Payer: MEDICAID

## 2019-11-12 DIAGNOSIS — J44.9 COPD (CHRONIC OBSTRUCTIVE PULMONARY DISEASE) (HCC): ICD-10-CM

## 2019-11-12 PROCEDURE — 94060 EVALUATION OF WHEEZING: CPT

## 2019-11-12 PROCEDURE — 94729 DIFFUSING CAPACITY: CPT

## 2019-11-12 PROCEDURE — 94726 PLETHYSMOGRAPHY LUNG VOLUMES: CPT

## 2020-04-22 NOTE — PROGRESS NOTES
Results given at [address] today.  SEE NN Full Thickness Lip Wedge Repair (Flap) Text: Given the location of the defect and the proximity to free margins a full thickness wedge repair was deemed most appropriate.  Using a sterile surgical marker, the appropriate repair was drawn incorporating the defect and placing the expected incisions perpendicular to the vermilion border.  The vermilion border was also meticulously outlined to ensure appropriate reapproximation during the repair.  The area thus outlined was incised through and through with a #15 scalpel blade.  The muscularis and dermis were reaproximated with deep sutures following hemostasis. Care was taken to realign the vermilion border before proceeding with the superficial closure.  Once the vermilion was realigned the superfical and mucosal closure was finished.

## 2022-03-19 PROBLEM — E78.1 HYPERTRIGLYCERIDEMIA: Status: ACTIVE | Noted: 2019-02-21

## 2022-03-19 PROBLEM — R19.5 POSITIVE FIT (FECAL IMMUNOCHEMICAL TEST): Status: ACTIVE | Noted: 2019-03-04

## 2022-03-19 PROBLEM — E78.5 DYSLIPIDEMIA, GOAL LDL BELOW 100: Status: ACTIVE | Noted: 2019-02-21

## 2025-01-26 DIAGNOSIS — I10 ESSENTIAL (PRIMARY) HYPERTENSION: ICD-10-CM

## 2025-01-27 RX ORDER — METOPROLOL TARTRATE 50 MG
TABLET ORAL
Qty: 180 TABLET | Refills: 1 | OUTPATIENT
Start: 2025-01-27